# Patient Record
Sex: MALE | Race: WHITE | NOT HISPANIC OR LATINO | Employment: UNEMPLOYED
[De-identification: names, ages, dates, MRNs, and addresses within clinical notes are randomized per-mention and may not be internally consistent; named-entity substitution may affect disease eponyms.]

---

## 2018-04-13 ENCOUNTER — SURGERY (OUTPATIENT)
Age: 53
End: 2018-04-13

## 2018-04-13 ENCOUNTER — HOSPITAL ENCOUNTER (INPATIENT)
Facility: HOSPITAL | Age: 53
LOS: 1 days | Discharge: HOME | DRG: 247 | End: 2018-04-14
Attending: EMERGENCY MEDICINE | Admitting: INTERNAL MEDICINE
Payer: COMMERCIAL

## 2018-04-13 DIAGNOSIS — I21.9 MYOCARDIAL INFARCTION: ICD-10-CM

## 2018-04-13 DIAGNOSIS — I10 ESSENTIAL (PRIMARY) HYPERTENSION: ICD-10-CM

## 2018-04-13 DIAGNOSIS — I21.4 NSTEMI (NON-ST ELEVATED MYOCARDIAL INFARCTION): ICD-10-CM

## 2018-04-13 DIAGNOSIS — I51.7 CARDIOMEGALY: ICD-10-CM

## 2018-04-13 DIAGNOSIS — R07.9 CHEST PAIN: ICD-10-CM

## 2018-04-13 DIAGNOSIS — I21.4 NON-STEMI (NON-ST ELEVATED MYOCARDIAL INFARCTION): Primary | ICD-10-CM

## 2018-04-13 PROBLEM — D72.829 LEUKOCYTOSIS: Status: ACTIVE | Noted: 2018-04-13

## 2018-04-13 PROBLEM — Z72.0 TOBACCO ABUSE: Status: ACTIVE | Noted: 2018-04-13

## 2018-04-13 LAB
ALBUMIN SERPL BCP-MCNC: 4.4 G/DL
ALP SERPL-CCNC: 84 U/L
ALT SERPL W/O P-5'-P-CCNC: 32 U/L
ANION GAP SERPL CALC-SCNC: 11 MMOL/L
APTT BLDCRRT: 25.7 SEC
AST SERPL-CCNC: 79 U/L
BASOPHILS # BLD AUTO: 0.02 K/UL
BASOPHILS NFR BLD: 0.1 %
BILIRUB SERPL-MCNC: 0.5 MG/DL
BUN SERPL-MCNC: 15 MG/DL
CALCIUM SERPL-MCNC: 9.5 MG/DL
CHLORIDE SERPL-SCNC: 105 MMOL/L
CO2 SERPL-SCNC: 21 MMOL/L
CORONARY STENOSIS: ABNORMAL
CORONARY STENT: YES
CREAT SERPL-MCNC: 0.8 MG/DL
D DIMER PPP IA.FEU-MCNC: 0.27 MG/L FEU
DIASTOLIC DYSFUNCTION: NO
DIFFERENTIAL METHOD: ABNORMAL
EOSINOPHIL # BLD AUTO: 0.1 K/UL
EOSINOPHIL NFR BLD: 0.8 %
ERYTHROCYTE [DISTWIDTH] IN BLOOD BY AUTOMATED COUNT: 12.9 %
EST. GFR  (AFRICAN AMERICAN): >60 ML/MIN/1.73 M^2
EST. GFR  (NON AFRICAN AMERICAN): >60 ML/MIN/1.73 M^2
GLUCOSE SERPL-MCNC: 118 MG/DL
HCT VFR BLD AUTO: 42.4 %
HGB BLD-MCNC: 14.9 G/DL
INR PPP: 1
LYMPHOCYTES # BLD AUTO: 1.9 K/UL
LYMPHOCYTES NFR BLD: 12.2 %
MCH RBC QN AUTO: 32.1 PG
MCHC RBC AUTO-ENTMCNC: 35.1 G/DL
MCV RBC AUTO: 91 FL
MONOCYTES # BLD AUTO: 1.2 K/UL
MONOCYTES NFR BLD: 7.2 %
NEUTROPHILS # BLD AUTO: 12.6 K/UL
NEUTROPHILS NFR BLD: 79.7 %
PLATELET # BLD AUTO: 241 K/UL
PMV BLD AUTO: 10.4 FL
POC ACTIVATED CLOTTING TIME K: 125 SEC (ref 74–137)
POC ACTIVATED CLOTTING TIME K: 136 SEC (ref 74–137)
POC ACTIVATED CLOTTING TIME K: 142 SEC (ref 74–137)
POC ACTIVATED CLOTTING TIME K: 323 SEC (ref 74–137)
POC ACTIVATED CLOTTING TIME K: 362 SEC (ref 74–137)
POC ACTIVATED CLOTTING TIME K: 98 SEC (ref 74–137)
POTASSIUM SERPL-SCNC: 3.8 MMOL/L
PROT SERPL-MCNC: 7.1 G/DL
PROTHROMBIN TIME: 10.7 SEC
RBC # BLD AUTO: 4.64 M/UL
RETIRED EF AND QEF - SEE NOTES: 60 (ref 55–65)
SAMPLE: ABNORMAL
SAMPLE: NORMAL
SODIUM SERPL-SCNC: 137 MMOL/L
TROPONIN I SERPL DL<=0.01 NG/ML-MCNC: 4.41 NG/ML
WBC # BLD AUTO: 15.87 K/UL

## 2018-04-13 PROCEDURE — 63600175 PHARM REV CODE 636 W HCPCS

## 2018-04-13 PROCEDURE — 92941 PRQ TRLML REVSC TOT OCCL AMI: CPT | Mod: RC,,, | Performed by: INTERNAL MEDICINE

## 2018-04-13 PROCEDURE — 85379 FIBRIN DEGRADATION QUANT: CPT

## 2018-04-13 PROCEDURE — 85730 THROMBOPLASTIN TIME PARTIAL: CPT

## 2018-04-13 PROCEDURE — 96365 THER/PROPH/DIAG IV INF INIT: CPT

## 2018-04-13 PROCEDURE — 63600175 PHARM REV CODE 636 W HCPCS: Performed by: EMERGENCY MEDICINE

## 2018-04-13 PROCEDURE — 99285 EMERGENCY DEPT VISIT HI MDM: CPT | Mod: 25

## 2018-04-13 PROCEDURE — 93458 L HRT ARTERY/VENTRICLE ANGIO: CPT | Mod: 26,59,, | Performed by: INTERNAL MEDICINE

## 2018-04-13 PROCEDURE — 84484 ASSAY OF TROPONIN QUANT: CPT

## 2018-04-13 PROCEDURE — 93306 TTE W/DOPPLER COMPLETE: CPT

## 2018-04-13 PROCEDURE — 96374 THER/PROPH/DIAG INJ IV PUSH: CPT | Mod: 59

## 2018-04-13 PROCEDURE — 027034Z DILATION OF CORONARY ARTERY, ONE ARTERY WITH DRUG-ELUTING INTRALUMINAL DEVICE, PERCUTANEOUS APPROACH: ICD-10-PCS | Performed by: INTERNAL MEDICINE

## 2018-04-13 PROCEDURE — 25000003 PHARM REV CODE 250: Performed by: PHYSICIAN ASSISTANT

## 2018-04-13 PROCEDURE — 93306 TTE W/DOPPLER COMPLETE: CPT | Mod: 26,,, | Performed by: INTERNAL MEDICINE

## 2018-04-13 PROCEDURE — B2151ZZ FLUOROSCOPY OF LEFT HEART USING LOW OSMOLAR CONTRAST: ICD-10-PCS | Performed by: INTERNAL MEDICINE

## 2018-04-13 PROCEDURE — 25000003 PHARM REV CODE 250: Performed by: EMERGENCY MEDICINE

## 2018-04-13 PROCEDURE — 25000003 PHARM REV CODE 250: Performed by: INTERNAL MEDICINE

## 2018-04-13 PROCEDURE — 85347 COAGULATION TIME ACTIVATED: CPT

## 2018-04-13 PROCEDURE — 99255 IP/OBS CONSLTJ NEW/EST HI 80: CPT | Mod: ,,, | Performed by: INTERNAL MEDICINE

## 2018-04-13 PROCEDURE — 25000003 PHARM REV CODE 250

## 2018-04-13 PROCEDURE — 93458 L HRT ARTERY/VENTRICLE ANGIO: CPT | Mod: 59

## 2018-04-13 PROCEDURE — 85610 PROTHROMBIN TIME: CPT

## 2018-04-13 PROCEDURE — 4A023N7 MEASUREMENT OF CARDIAC SAMPLING AND PRESSURE, LEFT HEART, PERCUTANEOUS APPROACH: ICD-10-PCS | Performed by: INTERNAL MEDICINE

## 2018-04-13 PROCEDURE — 85025 COMPLETE CBC W/AUTO DIFF WBC: CPT

## 2018-04-13 PROCEDURE — 93005 ELECTROCARDIOGRAM TRACING: CPT

## 2018-04-13 PROCEDURE — 80053 COMPREHEN METABOLIC PANEL: CPT

## 2018-04-13 PROCEDURE — B2111ZZ FLUOROSCOPY OF MULTIPLE CORONARY ARTERIES USING LOW OSMOLAR CONTRAST: ICD-10-PCS | Performed by: INTERNAL MEDICINE

## 2018-04-13 PROCEDURE — 99152 MOD SED SAME PHYS/QHP 5/>YRS: CPT | Mod: ,,, | Performed by: INTERNAL MEDICINE

## 2018-04-13 PROCEDURE — 63600175 PHARM REV CODE 636 W HCPCS: Mod: JG | Performed by: INTERNAL MEDICINE

## 2018-04-13 PROCEDURE — 93010 ELECTROCARDIOGRAM REPORT: CPT | Mod: ,,, | Performed by: INTERNAL MEDICINE

## 2018-04-13 PROCEDURE — C1769 GUIDE WIRE: HCPCS

## 2018-04-13 PROCEDURE — 20000000 HC ICU ROOM

## 2018-04-13 RX ORDER — PRASUGREL 10 MG/1
10 TABLET, FILM COATED ORAL DAILY
Status: DISCONTINUED | OUTPATIENT
Start: 2018-04-13 | End: 2018-04-14 | Stop reason: HOSPADM

## 2018-04-13 RX ORDER — ATORVASTATIN CALCIUM 40 MG/1
80 TABLET, FILM COATED ORAL NIGHTLY
Status: DISCONTINUED | OUTPATIENT
Start: 2018-04-13 | End: 2018-04-14 | Stop reason: HOSPADM

## 2018-04-13 RX ORDER — CLOPIDOGREL 300 MG/1
600 TABLET, FILM COATED ORAL
Status: COMPLETED | OUTPATIENT
Start: 2018-04-13 | End: 2018-04-13

## 2018-04-13 RX ORDER — PRASUGREL 10 MG/1
10 TABLET, FILM COATED ORAL DAILY
Status: DISCONTINUED | OUTPATIENT
Start: 2018-04-14 | End: 2018-04-13

## 2018-04-13 RX ORDER — MORPHINE SULFATE 4 MG/ML
2 INJECTION, SOLUTION INTRAMUSCULAR; INTRAVENOUS EVERY 4 HOURS PRN
Status: DISCONTINUED | OUTPATIENT
Start: 2018-04-13 | End: 2018-04-14

## 2018-04-13 RX ORDER — HYDRALAZINE HYDROCHLORIDE 20 MG/ML
10 INJECTION INTRAMUSCULAR; INTRAVENOUS EVERY 4 HOURS PRN
Status: DISCONTINUED | OUTPATIENT
Start: 2018-04-13 | End: 2018-04-14 | Stop reason: HOSPADM

## 2018-04-13 RX ORDER — SODIUM CHLORIDE 9 MG/ML
INJECTION, SOLUTION INTRAVENOUS CONTINUOUS
Status: DISCONTINUED | OUTPATIENT
Start: 2018-04-13 | End: 2018-04-14 | Stop reason: HOSPADM

## 2018-04-13 RX ORDER — LISINOPRIL 20 MG/1
20 TABLET ORAL DAILY
Status: ON HOLD | COMMUNITY
End: 2018-04-14 | Stop reason: HOSPADM

## 2018-04-13 RX ORDER — FAMOTIDINE 20 MG/1
20 TABLET, FILM COATED ORAL 2 TIMES DAILY
Status: DISCONTINUED | OUTPATIENT
Start: 2018-04-13 | End: 2018-04-14 | Stop reason: HOSPADM

## 2018-04-13 RX ORDER — HEPARIN SODIUM,PORCINE/D5W 25000/250
12 INTRAVENOUS SOLUTION INTRAVENOUS CONTINUOUS
Status: DISCONTINUED | OUTPATIENT
Start: 2018-04-13 | End: 2018-04-13

## 2018-04-13 RX ORDER — MELOXICAM 15 MG/1
15 TABLET ORAL DAILY
Status: ON HOLD | COMMUNITY
End: 2018-04-14 | Stop reason: HOSPADM

## 2018-04-13 RX ORDER — ASPIRIN 325 MG
325 TABLET, DELAYED RELEASE (ENTERIC COATED) ORAL
Status: COMPLETED | OUTPATIENT
Start: 2018-04-13 | End: 2018-04-13

## 2018-04-13 RX ORDER — SODIUM CHLORIDE 9 MG/ML
INJECTION, SOLUTION INTRAVENOUS CONTINUOUS
Status: DISCONTINUED | OUTPATIENT
Start: 2018-04-13 | End: 2018-04-13 | Stop reason: SDUPTHER

## 2018-04-13 RX ORDER — ASPIRIN 325 MG
325 TABLET, DELAYED RELEASE (ENTERIC COATED) ORAL DAILY
Status: DISCONTINUED | OUTPATIENT
Start: 2018-04-14 | End: 2018-04-13 | Stop reason: DRUGHIGH

## 2018-04-13 RX ORDER — NITROGLYCERIN 20 MG/100ML
5 INJECTION INTRAVENOUS CONTINUOUS
Status: DISCONTINUED | OUTPATIENT
Start: 2018-04-13 | End: 2018-04-14

## 2018-04-13 RX ORDER — HEPARIN SODIUM 5000 [USP'U]/ML
4000 INJECTION, SOLUTION INTRAVENOUS; SUBCUTANEOUS
Status: COMPLETED | OUTPATIENT
Start: 2018-04-13 | End: 2018-04-13

## 2018-04-13 RX ORDER — ASPIRIN 81 MG/1
81 TABLET ORAL DAILY
Status: DISCONTINUED | OUTPATIENT
Start: 2018-04-14 | End: 2018-04-14 | Stop reason: HOSPADM

## 2018-04-13 RX ORDER — ATORVASTATIN CALCIUM 40 MG/1
40 TABLET, FILM COATED ORAL DAILY
Status: ON HOLD | COMMUNITY
End: 2018-04-14 | Stop reason: HOSPADM

## 2018-04-13 RX ORDER — TIROFIBAN HYDROCHLORIDE 50 UG/ML
0.15 INJECTION INTRAVENOUS CONTINUOUS
Status: DISCONTINUED | OUTPATIENT
Start: 2018-04-13 | End: 2018-04-14

## 2018-04-13 RX ORDER — METOPROLOL TARTRATE 25 MG/1
25 TABLET, FILM COATED ORAL 2 TIMES DAILY
Status: DISCONTINUED | OUTPATIENT
Start: 2018-04-13 | End: 2018-04-14 | Stop reason: HOSPADM

## 2018-04-13 RX ORDER — HYDROCODONE BITARTRATE AND ACETAMINOPHEN 5; 325 MG/1; MG/1
1 TABLET ORAL EVERY 4 HOURS PRN
Status: DISCONTINUED | OUTPATIENT
Start: 2018-04-13 | End: 2018-04-14 | Stop reason: HOSPADM

## 2018-04-13 RX ORDER — MORPHINE SULFATE 4 MG/ML
4 INJECTION, SOLUTION INTRAMUSCULAR; INTRAVENOUS EVERY 4 HOURS PRN
Status: DISCONTINUED | OUTPATIENT
Start: 2018-04-13 | End: 2018-04-14

## 2018-04-13 RX ORDER — ALPRAZOLAM 0.5 MG/1
0.5 TABLET ORAL 3 TIMES DAILY PRN
Status: DISCONTINUED | OUTPATIENT
Start: 2018-04-13 | End: 2018-04-14 | Stop reason: HOSPADM

## 2018-04-13 RX ORDER — ONDANSETRON 2 MG/ML
4 INJECTION INTRAMUSCULAR; INTRAVENOUS EVERY 8 HOURS PRN
Status: DISCONTINUED | OUTPATIENT
Start: 2018-04-13 | End: 2018-04-14 | Stop reason: HOSPADM

## 2018-04-13 RX ORDER — NITROGLYCERIN 20 MG/100ML
5 INJECTION INTRAVENOUS CONTINUOUS
Status: DISCONTINUED | OUTPATIENT
Start: 2018-04-13 | End: 2018-04-13

## 2018-04-13 RX ORDER — ONDANSETRON 8 MG/1
8 TABLET, ORALLY DISINTEGRATING ORAL EVERY 8 HOURS PRN
Status: DISCONTINUED | OUTPATIENT
Start: 2018-04-13 | End: 2018-04-14 | Stop reason: HOSPADM

## 2018-04-13 RX ADMIN — NITROGLYCERIN 5 MCG/MIN: 20 INJECTION INTRAVENOUS at 03:04

## 2018-04-13 RX ADMIN — HEPARIN SODIUM 12 UNITS/KG/HR: 10000 INJECTION, SOLUTION INTRAVENOUS at 02:04

## 2018-04-13 RX ADMIN — ATORVASTATIN CALCIUM 80 MG: 40 TABLET, FILM COATED ORAL at 09:04

## 2018-04-13 RX ADMIN — PRASUGREL 10 MG: 10 TABLET, FILM COATED ORAL at 07:04

## 2018-04-13 RX ADMIN — ASPIRIN 325 MG: 325 TABLET, DELAYED RELEASE ORAL at 12:04

## 2018-04-13 RX ADMIN — TIROFIBAN 0.15 MCG/KG/MIN: 5 INJECTION, SOLUTION INTRAVENOUS at 07:04

## 2018-04-13 RX ADMIN — CLOPIDOGREL BISULFATE 600 MG: 300 TABLET, FILM COATED ORAL at 01:04

## 2018-04-13 RX ADMIN — MORPHINE SULFATE 2 MG: 4 INJECTION INTRAVENOUS at 09:04

## 2018-04-13 RX ADMIN — SODIUM CHLORIDE: 0.9 INJECTION, SOLUTION INTRAVENOUS at 10:04

## 2018-04-13 RX ADMIN — HEPARIN SODIUM 4000 UNITS: 5000 INJECTION, SOLUTION INTRAVENOUS; SUBCUTANEOUS at 02:04

## 2018-04-13 RX ADMIN — SODIUM CHLORIDE: 0.9 INJECTION, SOLUTION INTRAVENOUS at 07:04

## 2018-04-13 RX ADMIN — FAMOTIDINE 20 MG: 20 TABLET ORAL at 09:04

## 2018-04-13 RX ADMIN — SODIUM CHLORIDE: 0.9 INJECTION, SOLUTION INTRAVENOUS at 03:04

## 2018-04-13 NOTE — H&P (VIEW-ONLY)
[facility]  Cardiology  Consult Note    Patient Name: Anatoly Polanco  MRN: [id number]  Admission Date: 4/13/2018  Hospital Length of Stay: 0 days  Code Status: No Order   Attending Provider: Edgar Ledbetter Jr., MD   Consulting Provider: INDERJIT Auguste  Primary Care Physician: Pool Martinez MD  Principal Problem:<principal problem not specified>    Patient information was obtained from patient and ER records.     Inpatient consult to Cardiology  Consult performed by: JAD POWER  Consult ordered by: EDGAR LEDBETTER JR  Reason for consult: NSTEMI        Subjective:     Chief Complaint:  Chest pain      HPI:   Mr. Polanco presents to the ED with complaints of chest pain. Patient states that he was at work on yesterday and became very diaphoretic. Went inside to cool off, but still was very diaphoretic. Denies any associated SOB, but was nauseous.  States that he went home and rested. Chest pain continued this morning. 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 Presented to the ED when symptoms didn't improve. EKG upon arrival showed no evidence of STEMI. Did show LVH. Initial troponin 4.411. Patient states that he is still having chest pain at time of consult, 7/10. Has been started on Heparin gtt and Tridil. BP stable. Denies any family history of CAD. Doesn't smoke or drink ETOH       Past Medical History:   Diagnosis Date    Hypertension        Past Surgical History:   Procedure Laterality Date    JOINT REPLACEMENT         Review of patient's allergies indicates:  No Known Allergies    No current facility-administered medications on file prior to encounter.      No current outpatient prescriptions on file prior to encounter.     Family History     None        Social History Main Topics    Smoking status: Current Every Day Smoker    Smokeless tobacco: Not on file    Alcohol use Not on file    Drug use: Unknown     Sexual activity: Not on file     Review of Systems   Constitution: Negative for diaphoresis, weakness, malaise/fatigue, weight gain and weight loss.   HENT: Negative for congestion and nosebleeds.    Cardiovascular: Positive for chest pain. Negative for claudication, cyanosis, dyspnea on exertion, irregular heartbeat, leg swelling, near-syncope, orthopnea, palpitations, paroxysmal nocturnal dyspnea and syncope.   Respiratory: Negative for cough, hemoptysis, shortness of breath, sleep disturbances due to breathing, snoring, sputum production and wheezing.    Hematologic/Lymphatic: Negative for bleeding problem. Does not bruise/bleed easily.   Skin: Negative for rash.   Musculoskeletal: Negative for arthritis, back pain, falls, joint pain, muscle cramps and muscle weakness.   Gastrointestinal: Positive for nausea. Negative for abdominal pain, constipation, diarrhea, heartburn, hematemesis, hematochezia and melena.   Genitourinary: Negative for dysuria, hematuria and nocturia.   Neurological: Negative for excessive daytime sleepiness, dizziness, headaches, light-headedness, loss of balance, numbness and vertigo.     Objective:     Vital Signs (Most Recent):  Temp: 98.3 °F (36.8 °C) (04/13/18 1206)  Pulse: 64 (04/13/18 1502)  Resp: 15 (04/13/18 1502)  BP: (!) 144/85 (04/13/18 1502)  SpO2: 96 % (04/13/18 1502) Vital Signs (24h Range):  Temp:  [98.3 °F (36.8 °C)] 98.3 °F (36.8 °C)  Pulse:  [46-69] 64  Resp:  [15-22] 15  SpO2:  [95 %-98 %] 96 %  BP: (126-152)/(64-85) 144/85     Weight: 99.4 kg (219 lb 2.2 oz)  Body mass index is 29.72 kg/m².    SpO2: 96 %  O2 Device (Oxygen Therapy): nasal cannula w/ humidification    No intake or output data in the 24 hours ending 04/13/18 1529    Lines/Drains/Airways     Peripheral Intravenous Line                 Peripheral IV - Single Lumen 04/13/18 1234 Right Antecubital less than 1 day         Peripheral IV - Single Lumen 04/13/18 1510 Right Wrist less than 1 day                 Physical Exam   Constitutional: He is oriented to person, place, and time. He appears well-developed and well-nourished.   Neck: Neck supple. No JVD present.   Cardiovascular: Normal rate, regular rhythm and normal pulses.  Exam reveals no friction rub.    Murmur heard.  Pulmonary/Chest: Effort normal and breath sounds normal. No respiratory distress. He has no wheezes. He has no rales.   Abdominal: Soft. Bowel sounds are normal. He exhibits no distension.   Musculoskeletal: He exhibits no edema or tenderness.   Neurological: He is alert and oriented to person, place, and time.   Skin: Skin is warm and dry. No rash noted.   Psychiatric: He has a normal mood and affect. His behavior is normal.   Nursing note and vitals reviewed.      Significant Labs:   All pertinent lab results from the last 24 hours have been reviewed. and   Recent Lab Results       04/13/18  1234      Albumin 4.4     Alkaline Phosphatase 84     ALT 32     Anion Gap 11     aPTT 25.7  Comment:  aPTT therapeutic range = 39-69 seconds     AST 79(H)     Baso # 0.02     Basophil% 0.1     Total Bilirubin 0.5  Comment:  For infants and newborns, interpretation of results should be based  on gestational age, weight and in agreement with clinical  observations.  Premature Infant recommended reference ranges:  Up to 24 hours.............<8.0 mg/dL  Up to 48 hours............<12.0 mg/dL  3-5 days..................<15.0 mg/dL  6-29 days.................<15.0 mg/dL       BUN, Bld 15     Calcium 9.5     Chloride 105     CO2 21(L)     Creatinine 0.8     D-Dimer 0.27  Comment:  The quantitative D-dimer assay should be used as an aid in   the diagnosis of deep vein thrombosis and pulmonary embolism  in patients with the appropriate presentation and clinical  history. The upper limit of the reference interval and the clinical   cut off   point are identical. Causes of a positive (>0.50 mg/L FEU) D-Dimer   test  include, but are not limited to: DVT, PE, DIC,  thrombolytic   therapy, anticoagulant therapy, recent surgery, trauma, or   pregnancy, disseminated malignancy, aortic aneurysm, cirrhosis,  and severe infection. False negative results may occur in   patients with distal DVT.       Differential Method Automated     eGFR if  >60     eGFR if non  >60  Comment:  Calculation used to obtain the estimated glomerular filtration  rate (eGFR) is the CKD-EPI equation.        Eos # 0.1     Eosinophil% 0.8     Glucose 118(H)     Gran # (ANC) 12.6(H)     Gran% 79.7(H)     Hematocrit 42.4     Hemoglobin 14.9     Coumadin Monitoring INR 1.0  Comment:  Coumadin Therapy:  2.0 - 3.0 for INR for all indicators except mechanical heart valves  and antiphospholipid syndromes which should use 2.5 - 3.5.       Lymph # 1.9     Lymph% 12.2(L)     MCH 32.1(H)     MCHC 35.1     MCV 91     Mono # 1.2(H)     Mono% 7.2     MPV 10.4     Platelets 241     Potassium 3.8     Total Protein 7.1     Protime 10.7     RBC 4.64     RDW 12.9     Sodium 137     Troponin I 4.411  Comment:  The reference interval for Troponin I represents the 99th percentile   cutoff   for our facility and is consistent with 3rd generation assay   performance.  (H)     WBC 15.87(H)           Significant Imaging: Echocardiogram: 2D echo with color flow doppler Pending and X-Ray: CXR: X-Ray Chest 1 View   Narrative     Clinical Data:Chest pain    Comparison:  none    Findings:  Single view of the chest.      Cardiac silhouette is normal.  The lungs demonstrate no evidence of active disease.  No evidence of pleural effusion or pneumothorax.  Bones appear intact.   Impression        No acute process seen.      Electronically signed by: LILLIAN PRUITT MD  Date: 04/13/18  Time: 12:46          Assessment and Plan:     Non-STEMI (non-ST elevated myocardial infarction)    -Patient presents with NSTEMI.  -Had chest pain that started on yesterday.   -Trop of 4.411.   -LVH on ECG with no acute  changes  -Recommend starting Heparin gtt  -Plavix load 600mg daily   -Continue Tridil gtt for now  -ASA, Statin   -No BB due to bradycardia   -Check 2D echo  -Recommend [facility] today for NSTEMI and ongoing chest pain. Dr. Choi explained the risks, benefits and alternatives of the procedure in detail. The patient voices understanding and all questions have been answered. Patient agrees to proceed as planned.  -NS at 75/hr             VTE Risk Mitigation         Ordered     heparin 25,000 units in dextrose 5% 250 mL (100 units/mL) infusion; MALE  Continuous     Route:  Intravenous        04/13/18 1333     heparin 25,000 units in dextrose 5% 250 mL (100 units/mL) bolus from bag; PRN BOLUS  As needed (PRN)     Route:  Intravenous        04/13/18 1333     heparin 25,000 units in dextrose 5% 250 mL (100 units/mL) bolus from bag; PRN BOLUS  As needed (PRN)     Route:  Intravenous        04/13/18 1333        Chart reviewed. Patient examined by Dr. Choi and agrees with plan that has been outlined.     Thank you for your consult. I will follow-up with patient. Please contact us if you have any additional questions.    INDERJIT Auguste  Cardiology   [facility]

## 2018-04-13 NOTE — ED PROVIDER NOTES
"SCRIBE #1 NOTE: I, London Palmer, am scribing for, and in the presence of, Edgar Ledbetter Jr., MD. I have scribed the entire note.      History      Chief Complaint   Patient presents with    Chest Pain     with L shoulder/back pain with diaphoresis/nausea since yesterday.        Review of patient's allergies indicates:  No Known Allergies     HPI   HPI    4/13/2018, 12:23 PM   History obtained from the patient and wife      History of Present Illness: Anatoly Polanco is a 52 y.o. male patient who presents to the Emergency Department for left chest pain which onset gradually yesterday. Sxs are constant and moderate in severity. Pt reports lifting heavy "parts" 2 days ago. There are no mitigating or exacerbating factors noted. Associated sxs include left shoulder pain.  Pt denies any fever, emesis, diarrhea, numbness, leg swelling, calf pain, SOB, HA, LOC, and all other sxs at this time. Pt is a daily smoker. No further complaints or concerns at this time.       Arrival mode: Personal vehicle      PCP: Pool Martinez MD       Past Medical History:  Past Medical History:   Diagnosis Date    Hypertension        Past Surgical History:  Past Surgical History:   Procedure Laterality Date    JOINT REPLACEMENT           Family History:  unknown    Social History:  Social History     Social History Main Topics    Smoking status: Current Every Day Smoker    Smokeless tobacco: unknown    Alcohol use unknown    Drug use: Unknown    Sexual activity: unknown       ROS   Review of Systems   Constitutional: Negative for fever.   HENT: Negative for sore throat.    Respiratory: Negative for shortness of breath.    Cardiovascular: Positive for chest pain.   Gastrointestinal: Positive for nausea. Negative for abdominal pain, constipation, diarrhea and vomiting.   Genitourinary: Negative for dysuria.   Musculoskeletal: Positive for arthralgias (L shoulder). Negative for back pain.   Skin: Negative for rash. "   Neurological: Negative for weakness.   Hematological: Does not bruise/bleed easily.     Physical Exam      Initial Vitals [04/13/18 1206]   BP Pulse Resp Temp SpO2   (!) 142/68 (!) 46 18 98.3 °F (36.8 °C) 98 %      MAP       92.67          Physical Exam  Nursing Notes and Vital Signs Reviewed.  Constitutional: Patient is in no acute distress. Well-developed and well-nourished.  Head: Atraumatic. Normocephalic.  Eyes: PERRL. EOM intact. Conjunctivae are not pale. No scleral icterus.  ENT: Mucous membranes are moist. Oropharynx is clear and symmetric.    Neck: Supple. Full ROM. No lymphadenopathy.  Cardiovascular: Bradycardia. Regular rhythm. No murmurs, rubs, or gallops. Distal pulses are 2+ and symmetric.  Pulmonary/Chest: No respiratory distress. Clear to auscultation bilaterally. No wheezing or rales.  Abdominal: Soft and non-distended.  There is no tenderness.  No rebound, guarding, or rigidity. Good bowel sounds.  Genitourinary: No CVA tenderness  Musculoskeletal: Moves all extremities. No obvious deformities. No edema. No calf tenderness.  Skin: Warm and dry.  Neurological:  Alert, awake, and appropriate.  Normal speech.  No acute focal neurological deficits are appreciated.  Psychiatric: Normal affect. Good eye contact. Appropriate in content.    ED Course    Procedures  ED Vital Signs:  Vitals:    04/13/18 1206 04/13/18 1207 04/13/18 1302 04/13/18 1332   BP: (!) 142/68  126/70 131/70   Pulse: (!) 46  (!) 47 (!) 49   Resp: 18  (!) 22 19   Temp: 98.3 °F (36.8 °C)      TempSrc: Oral      SpO2: 98%  95% 95%   Weight:  99.4 kg (219 lb 2.2 oz)     Height: 6' (1.829 m)       04/13/18 1402   BP: (!) 152/78   Pulse: 69   Resp: 19   Temp:    TempSrc:    SpO2: 96%   Weight:    Height:        Abnormal Lab Results:  Labs Reviewed   CBC W/ AUTO DIFFERENTIAL - Abnormal; Notable for the following:        Result Value    WBC 15.87 (*)     MCH 32.1 (*)     Gran # (ANC) 12.6 (*)     Mono # 1.2 (*)     Gran% 79.7 (*)      Lymph% 12.2 (*)     All other components within normal limits   COMPREHENSIVE METABOLIC PANEL - Abnormal; Notable for the following:     CO2 21 (*)     Glucose 118 (*)     AST 79 (*)     All other components within normal limits   TROPONIN I - Abnormal; Notable for the following:     Troponin I 4.411 (*)     All other components within normal limits   PROTIME-INR   APTT   D DIMER, QUANTITATIVE   APTT   PROTIME-INR   PLATELET COUNT   CBC W/ AUTO DIFFERENTIAL        All Lab Results:  Results for orders placed or performed during the hospital encounter of 04/13/18   CBC auto differential   Result Value Ref Range    WBC 15.87 (H) 3.90 - 12.70 K/uL    RBC 4.64 4.60 - 6.20 M/uL    Hemoglobin 14.9 14.0 - 18.0 g/dL    Hematocrit 42.4 40.0 - 54.0 %    MCV 91 82 - 98 fL    MCH 32.1 (H) 27.0 - 31.0 pg    MCHC 35.1 32.0 - 36.0 g/dL    RDW 12.9 11.5 - 14.5 %    Platelets 241 150 - 350 K/uL    MPV 10.4 9.2 - 12.9 fL    Gran # (ANC) 12.6 (H) 1.8 - 7.7 K/uL    Lymph # 1.9 1.0 - 4.8 K/uL    Mono # 1.2 (H) 0.3 - 1.0 K/uL    Eos # 0.1 0.0 - 0.5 K/uL    Baso # 0.02 0.00 - 0.20 K/uL    Gran% 79.7 (H) 38.0 - 73.0 %    Lymph% 12.2 (L) 18.0 - 48.0 %    Mono% 7.2 4.0 - 15.0 %    Eosinophil% 0.8 0.0 - 8.0 %    Basophil% 0.1 0.0 - 1.9 %    Differential Method Automated    Comprehensive metabolic panel   Result Value Ref Range    Sodium 137 136 - 145 mmol/L    Potassium 3.8 3.5 - 5.1 mmol/L    Chloride 105 95 - 110 mmol/L    CO2 21 (L) 23 - 29 mmol/L    Glucose 118 (H) 70 - 110 mg/dL    BUN, Bld 15 6 - 20 mg/dL    Creatinine 0.8 0.5 - 1.4 mg/dL    Calcium 9.5 8.7 - 10.5 mg/dL    Total Protein 7.1 6.0 - 8.4 g/dL    Albumin 4.4 3.5 - 5.2 g/dL    Total Bilirubin 0.5 0.1 - 1.0 mg/dL    Alkaline Phosphatase 84 55 - 135 U/L    AST 79 (H) 10 - 40 U/L    ALT 32 10 - 44 U/L    Anion Gap 11 8 - 16 mmol/L    eGFR if African American >60 >60 mL/min/1.73 m^2    eGFR if non African American >60 >60 mL/min/1.73 m^2   Protime-INR   Result Value Ref Range     Prothrombin Time 10.7 9.0 - 12.5 sec    INR 1.0 0.8 - 1.2   APTT   Result Value Ref Range    aPTT 25.7 21.0 - 32.0 sec   Troponin I   Result Value Ref Range    Troponin I 4.411 (H) 0.000 - 0.026 ng/mL   D dimer, quantitative   Result Value Ref Range    D-Dimer 0.27 <0.50 mg/L FEU         Imaging Results:  Imaging Results          X-Ray Chest AP Portable (Final result)  Result time 04/13/18 12:47:00    Final result by Alexander Mariano MD (04/13/18 12:47:00)                 Impression:       No acute process seen.      Electronically signed by: ALEXANDER MARIANO MD  Date:     04/13/18  Time:    12:46              Narrative:    Clinical Data:Chest pain    Comparison:  none    Findings:  Single view of the chest.      Cardiac silhouette is normal.  The lungs demonstrate no evidence of active disease.  No evidence of pleural effusion or pneumothorax.  Bones appear intact.                                      The EKG was ordered, reviewed, and independently interpreted by the ED provider.  Interpretation time: 12:13  Rate: 47 BPM  Rhythm: sinus bradycardia  Interpretation: LVH with repolarization abnormality. No STEMI.      The Emergency Provider reviewed the vital signs and test results, which are outlined above.    ED Discussion     1:35 PM: Dr. Ledbetter discussed the pt's case with Dr. Choi (Cardiology) who recommends starting pt on PLAVIX and admit to hospital.    1:36 PM: Re-evaluated pt. I have discussed test results, shared treatment plan, and the need for admission with patient and family at bedside. Pt and family express understanding at this time and agree with all information. All questions answered. Pt and family have no further questions or concerns at this time. Pt is ready for admit.    2:12 PM: Discussed case with DONAVAN Jeffrey (Hospital Medicine). Tammy agrees with current care and management of pt and accepts admission.   Admitting Service: Hospital medicine   Admitting Physician: Dr. Bartholomew  Admit to:  telemetry      ED Medication(s):  Medications   heparin 25,000 units in dextrose 5% 250 mL (100 units/mL) bolus from bag; PRN BOLUS (not administered)   heparin 25,000 units in dextrose 5% 250 mL (100 units/mL) bolus from bag; PRN BOLUS (not administered)   heparin 25,000 units in dextrose 5% 250 mL (100 units/mL) infusion; MALE (12 Units/kg/hr × 99.4 kg Intravenous New Bag 4/13/18 1405)   aspirin EC tablet 325 mg (325 mg Oral Given 4/13/18 1245)   heparin (porcine) injection 4,000 Units (4,000 Units Intravenous Given 4/13/18 1400)   clopidogrel tablet 600 mg (600 mg Oral Given 4/13/18 1359)       New Prescriptions    No medications on file             Medical Decision Making    Medical Decision Making:   Clinical Tests:   Lab Tests: Reviewed and Ordered  Radiological Study: Reviewed and Ordered  Medical Tests: Reviewed and Ordered           Scribe Attestation:   Scribe #1: I performed the above scribed service and the documentation accurately describes the services I performed. I attest to the accuracy of the note.    Attending:   Physician Attestation Statement for Scribe #1: I, Edgar Ledbetter Jr., MD, personally performed the services described in this documentation, as scribed by London Palmer, in my presence, and it is both accurate and complete.          Clinical Impression       ICD-10-CM ICD-9-CM   1. Non-STEMI (non-ST elevated myocardial infarction) I21.4 410.70   2. Chest pain R07.9 786.50       Disposition:   Disposition: Admitted  Condition: Fair         Edgar Ledbetter Jr., MD  04/13/18 6589

## 2018-04-13 NOTE — ASSESSMENT & PLAN NOTE
-ASA daily.   -Heparin and Tridil drips.   -Cardiology consult.   -Trend troponins.   -Continue lisinopril and statin.   -Hold beta-blockers due to asymptomatic bradycardia.    -Patient evaluated for Cardiac rehab and is not a candidate at this time. He will be reevaluated and referred when appropriate.

## 2018-04-13 NOTE — ED NOTES
Patient placed on continuous cardiac monitor, automatic blood pressure cuff and continuous pulse oximeter.[email or web address] [email or web address]

## 2018-04-13 NOTE — INTERVAL H&P NOTE
The patient has been examined and the H&P has been reviewed:    I concur with the findings and no changes have occurred since H&P was written.    Anesthesia/Surgery risks, benefits and alternative options discussed and understood by patient/family.          Active Hospital Problems    Diagnosis  POA    *Non-STEMI (non-ST elevated myocardial infarction) [I21.4]  Yes    Hypertension [I10]  Yes    Tobacco abuse [Z72.0]  Yes      Resolved Hospital Problems    Diagnosis Date Resolved POA   No resolved problems to display.

## 2018-04-13 NOTE — CONSULTS
[facility]  Cardiology  Consult Note    Patient Name: Anatoly Polanco  MRN: [id number]  Admission Date: 4/13/2018  Hospital Length of Stay: 0 days  Code Status: No Order   Attending Provider: Edgar Ledbetter Jr., MD   Consulting Provider: INDERJIT Auguste  Primary Care Physician: Pool Martinez MD  Principal Problem:<principal problem not specified>    Patient information was obtained from patient and ER records.     Inpatient consult to Cardiology  Consult performed by: JAD POWER  Consult ordered by: EDGAR LEDBETTER JR  Reason for consult: NSTEMI        Subjective:     Chief Complaint:  Chest pain      HPI:   Mr. Polanco presents to the ED with complaints of chest pain. Patient states that he was at work on yesterday and became very diaphoretic. Went inside to cool off, but still was very diaphoretic. Denies any associated SOB, but was nauseous.  States that he went home and rested. Chest pain continued this morning. 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 Presented to the ED when symptoms didn't improve. EKG upon arrival showed no evidence of STEMI. Did show LVH. Initial troponin 4.411. Patient states that he is still having chest pain at time of consult, 7/10. Has been started on Heparin gtt and Tridil. BP stable. Denies any family history of CAD. Doesn't smoke or drink ETOH       Past Medical History:   Diagnosis Date    Hypertension        Past Surgical History:   Procedure Laterality Date    JOINT REPLACEMENT         Review of patient's allergies indicates:  No Known Allergies    No current facility-administered medications on file prior to encounter.      No current outpatient prescriptions on file prior to encounter.     Family History     None        Social History Main Topics    Smoking status: Current Every Day Smoker    Smokeless tobacco: Not on file    Alcohol use Not on file    Drug use: Unknown     Sexual activity: Not on file     Review of Systems   Constitution: Negative for diaphoresis, weakness, malaise/fatigue, weight gain and weight loss.   HENT: Negative for congestion and nosebleeds.    Cardiovascular: Positive for chest pain. Negative for claudication, cyanosis, dyspnea on exertion, irregular heartbeat, leg swelling, near-syncope, orthopnea, palpitations, paroxysmal nocturnal dyspnea and syncope.   Respiratory: Negative for cough, hemoptysis, shortness of breath, sleep disturbances due to breathing, snoring, sputum production and wheezing.    Hematologic/Lymphatic: Negative for bleeding problem. Does not bruise/bleed easily.   Skin: Negative for rash.   Musculoskeletal: Negative for arthritis, back pain, falls, joint pain, muscle cramps and muscle weakness.   Gastrointestinal: Positive for nausea. Negative for abdominal pain, constipation, diarrhea, heartburn, hematemesis, hematochezia and melena.   Genitourinary: Negative for dysuria, hematuria and nocturia.   Neurological: Negative for excessive daytime sleepiness, dizziness, headaches, light-headedness, loss of balance, numbness and vertigo.     Objective:     Vital Signs (Most Recent):  Temp: 98.3 °F (36.8 °C) (04/13/18 1206)  Pulse: 64 (04/13/18 1502)  Resp: 15 (04/13/18 1502)  BP: (!) 144/85 (04/13/18 1502)  SpO2: 96 % (04/13/18 1502) Vital Signs (24h Range):  Temp:  [98.3 °F (36.8 °C)] 98.3 °F (36.8 °C)  Pulse:  [46-69] 64  Resp:  [15-22] 15  SpO2:  [95 %-98 %] 96 %  BP: (126-152)/(64-85) 144/85     Weight: 99.4 kg (219 lb 2.2 oz)  Body mass index is 29.72 kg/m².    SpO2: 96 %  O2 Device (Oxygen Therapy): nasal cannula w/ humidification    No intake or output data in the 24 hours ending 04/13/18 1529    Lines/Drains/Airways     Peripheral Intravenous Line                 Peripheral IV - Single Lumen 04/13/18 1234 Right Antecubital less than 1 day         Peripheral IV - Single Lumen 04/13/18 1510 Right Wrist less than 1 day                 Physical Exam   Constitutional: He is oriented to person, place, and time. He appears well-developed and well-nourished.   Neck: Neck supple. No JVD present.   Cardiovascular: Normal rate, regular rhythm and normal pulses.  Exam reveals no friction rub.    Murmur heard.  Pulmonary/Chest: Effort normal and breath sounds normal. No respiratory distress. He has no wheezes. He has no rales.   Abdominal: Soft. Bowel sounds are normal. He exhibits no distension.   Musculoskeletal: He exhibits no edema or tenderness.   Neurological: He is alert and oriented to person, place, and time.   Skin: Skin is warm and dry. No rash noted.   Psychiatric: He has a normal mood and affect. His behavior is normal.   Nursing note and vitals reviewed.      Significant Labs:   All pertinent lab results from the last 24 hours have been reviewed. and   Recent Lab Results       04/13/18  1234      Albumin 4.4     Alkaline Phosphatase 84     ALT 32     Anion Gap 11     aPTT 25.7  Comment:  aPTT therapeutic range = 39-69 seconds     AST 79(H)     Baso # 0.02     Basophil% 0.1     Total Bilirubin 0.5  Comment:  For infants and newborns, interpretation of results should be based  on gestational age, weight and in agreement with clinical  observations.  Premature Infant recommended reference ranges:  Up to 24 hours.............<8.0 mg/dL  Up to 48 hours............<12.0 mg/dL  3-5 days..................<15.0 mg/dL  6-29 days.................<15.0 mg/dL       BUN, Bld 15     Calcium 9.5     Chloride 105     CO2 21(L)     Creatinine 0.8     D-Dimer 0.27  Comment:  The quantitative D-dimer assay should be used as an aid in   the diagnosis of deep vein thrombosis and pulmonary embolism  in patients with the appropriate presentation and clinical  history. The upper limit of the reference interval and the clinical   cut off   point are identical. Causes of a positive (>0.50 mg/L FEU) D-Dimer   test  include, but are not limited to: DVT, PE, DIC,  thrombolytic   therapy, anticoagulant therapy, recent surgery, trauma, or   pregnancy, disseminated malignancy, aortic aneurysm, cirrhosis,  and severe infection. False negative results may occur in   patients with distal DVT.       Differential Method Automated     eGFR if  >60     eGFR if non  >60  Comment:  Calculation used to obtain the estimated glomerular filtration  rate (eGFR) is the CKD-EPI equation.        Eos # 0.1     Eosinophil% 0.8     Glucose 118(H)     Gran # (ANC) 12.6(H)     Gran% 79.7(H)     Hematocrit 42.4     Hemoglobin 14.9     Coumadin Monitoring INR 1.0  Comment:  Coumadin Therapy:  2.0 - 3.0 for INR for all indicators except mechanical heart valves  and antiphospholipid syndromes which should use 2.5 - 3.5.       Lymph # 1.9     Lymph% 12.2(L)     MCH 32.1(H)     MCHC 35.1     MCV 91     Mono # 1.2(H)     Mono% 7.2     MPV 10.4     Platelets 241     Potassium 3.8     Total Protein 7.1     Protime 10.7     RBC 4.64     RDW 12.9     Sodium 137     Troponin I 4.411  Comment:  The reference interval for Troponin I represents the 99th percentile   cutoff   for our facility and is consistent with 3rd generation assay   performance.  (H)     WBC 15.87(H)           Significant Imaging: Echocardiogram: 2D echo with color flow doppler Pending and X-Ray: CXR: X-Ray Chest 1 View   Narrative     Clinical Data:Chest pain    Comparison:  none    Findings:  Single view of the chest.      Cardiac silhouette is normal.  The lungs demonstrate no evidence of active disease.  No evidence of pleural effusion or pneumothorax.  Bones appear intact.   Impression        No acute process seen.      Electronically signed by: LILLIAN PRUITT MD  Date: 04/13/18  Time: 12:46          Assessment and Plan:     Non-STEMI (non-ST elevated myocardial infarction)    -Patient presents with NSTEMI.  -Had chest pain that started on yesterday.   -Trop of 4.411.   -LVH on ECG with no acute  changes  -Recommend starting Heparin gtt  -Plavix load 600mg daily   -Continue Tridil gtt for now  -ASA, Statin   -No BB due to bradycardia   -Check 2D echo  -Recommend [facility] today for NSTEMI and ongoing chest pain. Dr. Choi explained the risks, benefits and alternatives of the procedure in detail. The patient voices understanding and all questions have been answered. Patient agrees to proceed as planned.  -NS at 75/hr             VTE Risk Mitigation         Ordered     heparin 25,000 units in dextrose 5% 250 mL (100 units/mL) infusion; MALE  Continuous     Route:  Intravenous        04/13/18 1333     heparin 25,000 units in dextrose 5% 250 mL (100 units/mL) bolus from bag; PRN BOLUS  As needed (PRN)     Route:  Intravenous        04/13/18 1333     heparin 25,000 units in dextrose 5% 250 mL (100 units/mL) bolus from bag; PRN BOLUS  As needed (PRN)     Route:  Intravenous        04/13/18 1333        Chart reviewed. Patient examined by Dr. Choi and agrees with plan that has been outlined.     Thank you for your consult. I will follow-up with patient. Please contact us if you have any additional questions.    INDERJIT Auguste  Cardiology   [facility]

## 2018-04-13 NOTE — SUBJECTIVE & OBJECTIVE
Past Medical History:   Diagnosis Date    Hypertension        Past Surgical History:   Procedure Laterality Date    JOINT REPLACEMENT         Review of patient's allergies indicates:  No Known Allergies    Medications:   Medication Details Provider Last Reconciliation Status   atorvastatin (LIPITOR) 40 MG tablet Take 40 mg by mouth once daily. Historical Provider, MD Needs Review   lisinopril (PRINIVIL,ZESTRIL) 20 MG tablet Take 20 mg by mouth once daily. Historical Provider, MD Needs Review   meloxicam (MOBIC) 15 MG tablet Take 15 mg by mouth once daily. Historical Provider, MD Needs Review       Family History     Reviewed and not pertinent.         Social History Main Topics    Smoking status: Current Every Day Smoker    Smokeless tobacco: Not on file    Alcohol use Not on file    Drug use: Unknown    Sexual activity: Not on file     Review of Systems   Constitutional: Positive for diaphoresis. Negative for appetite change, chills, fatigue and fever.   HENT: Negative for congestion, ear pain, mouth sores, sore throat and trouble swallowing.    Eyes: Negative for pain and visual disturbance.   Respiratory: Negative for cough, chest tightness and shortness of breath.    Cardiovascular: Positive for chest pain. Negative for palpitations and leg swelling.   Gastrointestinal: Positive for nausea. Negative for abdominal pain, constipation and diarrhea.   Endocrine: Negative for cold intolerance, heat intolerance, polydipsia and polyuria.   Genitourinary: Negative for dysuria, frequency and hematuria.   Musculoskeletal: Negative for arthralgias, back pain, myalgias and neck pain.   Skin: Negative for pallor, rash and wound.   Allergic/Immunologic: Negative for environmental allergies and immunocompromised state.   Neurological: Negative for dizziness, seizures, syncope, weakness, numbness and headaches.   Hematological: Negative for adenopathy. Does not bruise/bleed easily.   Psychiatric/Behavioral: Negative for  agitation, confusion and sleep disturbance.     Objective:     Vital Signs (Most Recent):  Temp: 98.3 °F (36.8 °C) (04/13/18 1206)  Pulse: (!) 52 (04/13/18 1542)  Resp: 20 (04/13/18 1542)  BP: 133/62 (04/13/18 1542)  SpO2: 95 % (04/13/18 1542) Vital Signs (24h Range):  Temp:  [98.3 °F (36.8 °C)] 98.3 °F (36.8 °C)  Pulse:  [46-69] 52  Resp:  [15-26] 20  SpO2:  [95 %-98 %] 95 %  BP: (126-154)/(62-85) 133/62     Weight: 99.4 kg (219 lb 2.2 oz)  Body mass index is 29.72 kg/m².    Physical Exam   Constitutional: He is oriented to person, place, and time. He appears well-developed and well-nourished.   HENT:   Head: Normocephalic and atraumatic.   Eyes: Conjunctivae are normal.   Neck: Neck supple. No JVD present.   Cardiovascular: Normal rate, regular rhythm and normal heart sounds.    Pulmonary/Chest: Effort normal and breath sounds normal. He has no wheezes.   Abdominal: Soft. Bowel sounds are normal. He exhibits no distension. There is no tenderness.   Musculoskeletal: Normal range of motion.   Neurological: He is alert and oriented to person, place, and time.   Skin: Skin is warm and dry. No rash noted.   Psychiatric: His speech is normal. Thought content normal. His affect is blunt. He is withdrawn.   Nursing note and vitals reviewed.          Significant Labs:   CBC:   Recent Labs  Lab 04/13/18  1234   WBC 15.87*   HGB 14.9   HCT 42.4        CMP:   Recent Labs  Lab 04/13/18  1234      K 3.8      CO2 21*   *   BUN 15   CREATININE 0.8   CALCIUM 9.5   PROT 7.1   ALBUMIN 4.4   BILITOT 0.5   ALKPHOS 84   AST 79*   ALT 32   ANIONGAP 11   EGFRNONAA >60     Troponin:   Recent Labs  Lab 04/13/18  1234   TROPONINI 4.411*     All pertinent labs within the past 24 hours have been reviewed.    Significant Imaging: I have reviewed all pertinent imaging results/findings within the past 24 hours.

## 2018-04-13 NOTE — HPI
Anatoly Polanco is a 52 year old male with hypertension and hyperlipidemia who presented to the ED with complaints of chest pain that began yesterday afternoon while he was working. The patient describes the pain as sharp, located over his entire chest. The pain radiated into his back, bilateral arms and bilateral jaw. There was associated diaphoresis and nausea. Symptoms were improved by a pain pill last night, but were present at their initial intensity this morning. The patient denies vomiting, SOB and fever. He is followed by Dr. Sharma and reports having a stress test within the last two years. He is compliant with his lisinopril and Lipitor. In the ED, the patient was found to have a troponin of 4.411 and bradycardia with nonspecific EKG changes.

## 2018-04-13 NOTE — H&P
[facility] Hospital Medicine  History & Physical    Patient Name: Anatoly Polanco  MRN: [id number]  Admission Date: 4/13/2018  Attending Physician: Edgar Ledbetter Jr., MD   Primary Care Provider: Pool Martinez MD         Patient information was obtained from patient, spouse/SO, past medical records and ER records.     Subjective:     Principal Problem:Non-STEMI (non-ST elevated myocardial infarction)    Chief Complaint:   Chief Complaint   Patient presents with    Chest Pain     with L shoulder/back pain with diaphoresis/nausea since yesterday.         HPI: Anatoly Polanco is a 52 year old male with hypertension and hyperlipidemia who presented to the ED with complaints of chest pain that began yesterday afternoon while he was working. The patient describes the pain as sharp, located over his entire chest. The pain radiated into his back, bilateral arms and bilateral jaw. There was associated diaphoresis and nausea. Symptoms were improved by a pain pill last night, but were present at their initial intensity this morning. The patient denies vomiting, SOB and fever. He is followed by Dr. Sharma and reports having a stress test within the last two years. He is compliant with his lisinopril and Lipitor. In the ED, the patient was found to have a troponin of 4.411 and bradycardia with nonspecific EKG changes.      Past Medical History:   Diagnosis Date    Hypertension        Past Surgical History:   Procedure Laterality Date    JOINT REPLACEMENT         Review of patient's allergies indicates:  No Known Allergies    Medications:   Medication Details Provider Last Reconciliation Status   atorvastatin (LIPITOR) 40 MG tablet Take 40 mg by mouth once daily. Historical Provider, MD Needs Review   lisinopril (PRINIVIL,ZESTRIL) 20 MG tablet Take 20 mg by mouth once daily. Historical Provider, MD Needs Review   meloxicam (MOBIC) 15 MG tablet Take 15 mg by mouth once daily. Historical Provider, MD Needs  Review       Family History     Reviewed and not pertinent.         Social History Main Topics    Smoking status: Current Every Day Smoker    Smokeless tobacco: Not on file    Alcohol use Not on file    Drug use: Unknown    Sexual activity: Not on file     Review of Systems   Constitutional: Positive for diaphoresis. Negative for appetite change, chills, fatigue and fever.   HENT: Negative for congestion, ear pain, mouth sores, sore throat and trouble swallowing.    Eyes: Negative for pain and visual disturbance.   Respiratory: Negative for cough, chest tightness and shortness of breath.    Cardiovascular: Positive for chest pain. Negative for palpitations and leg swelling.   Gastrointestinal: Positive for nausea. Negative for abdominal pain, constipation and diarrhea.   Endocrine: Negative for cold intolerance, heat intolerance, polydipsia and polyuria.   Genitourinary: Negative for dysuria, frequency and hematuria.   Musculoskeletal: Negative for arthralgias, back pain, myalgias and neck pain.   Skin: Negative for pallor, rash and wound.   Allergic/Immunologic: Negative for environmental allergies and immunocompromised state.   Neurological: Negative for dizziness, seizures, syncope, weakness, numbness and headaches.   Hematological: Negative for adenopathy. Does not bruise/bleed easily.   Psychiatric/Behavioral: Negative for agitation, confusion and sleep disturbance.     Objective:     Vital Signs (Most Recent):  Temp: 98.3 °F (36.8 °C) (04/13/18 1206)  Pulse: (!) 52 (04/13/18 1542)  Resp: 20 (04/13/18 1542)  BP: 133/62 (04/13/18 1542)  SpO2: 95 % (04/13/18 1542) Vital Signs (24h Range):  Temp:  [98.3 °F (36.8 °C)] 98.3 °F (36.8 °C)  Pulse:  [46-69] 52  Resp:  [15-26] 20  SpO2:  [95 %-98 %] 95 %  BP: (126-154)/(62-85) 133/62     Weight: 99.4 kg (219 lb 2.2 oz)  Body mass index is 29.72 kg/m².    Physical Exam   Constitutional: He is oriented to person, place, and time. He appears well-developed and  well-nourished.   HENT:   Head: Normocephalic and atraumatic.   Eyes: Conjunctivae are normal.   Neck: Neck supple. No JVD present.   Cardiovascular: Normal rate, regular rhythm and normal heart sounds.    Pulmonary/Chest: Effort normal and breath sounds normal. He has no wheezes.   Abdominal: Soft. Bowel sounds are normal. He exhibits no distension. There is no tenderness.   Musculoskeletal: Normal range of motion.   Neurological: He is alert and oriented to person, place, and time.   Skin: Skin is warm and dry. No rash noted.   Psychiatric: His speech is normal. Thought content normal. His affect is blunt. He is withdrawn.   Nursing note and vitals reviewed.          Significant Labs:   CBC:   Recent Labs  Lab 04/13/18  1234   WBC 15.87*   HGB 14.9   HCT 42.4        CMP:   Recent Labs  Lab 04/13/18  1234      K 3.8      CO2 21*   *   BUN 15   CREATININE 0.8   CALCIUM 9.5   PROT 7.1   ALBUMIN 4.4   BILITOT 0.5   ALKPHOS 84   AST 79*   ALT 32   ANIONGAP 11   EGFRNONAA >60     Troponin:   Recent Labs  Lab 04/13/18  1234   TROPONINI 4.411*     All pertinent labs within the past 24 hours have been reviewed.    Significant Imaging: I have reviewed all pertinent imaging results/findings within the past 24 hours.    Assessment/Plan:     * Non-STEMI (non-ST elevated myocardial infarction)    -ASA daily.   -Heparin and Tridil drips.   -Cardiology consult.   -Trend troponins.   -Continue lisinopril and statin.   -Hold beta-blockers due to asymptomatic bradycardia.    -Patient evaluated for Cardiac rehab and is not a candidate at this time. He will be reevaluated and referred when appropriate.         Leukocytosis    -Likely reactive.   -No signs or symptoms of infection.   -Monitor.           Hypertension    Continue lisinopril.           Tobacco abuse    -Patient counseled on cessation.   -Declined nicotine patch.             VTE Risk Mitigation         Ordered     IP VTE HIGH RISK PATIENT  Once       04/13/18 1614     Reason for No Pharmacological VTE Prophylaxis  Once      04/13/18 1614     heparin 25,000 units in dextrose 5% 250 mL (100 units/mL) infusion; MALE  Continuous     Route:  Intravenous        04/13/18 1333     heparin 25,000 units in dextrose 5% 250 mL (100 units/mL) bolus from bag; PRN BOLUS  As needed (PRN)     Route:  Intravenous        04/13/18 1333     heparin 25,000 units in dextrose 5% 250 mL (100 units/mL) bolus from bag; PRN BOLUS  As needed (PRN)     Route:  Intravenous        04/13/18 1333             DONAVAN Mayfield  Department of Hospital Medicine   [facility]

## 2018-04-13 NOTE — HPI
Mr. Polanco presents to the ED with complaints of chest pain. Patient states that he was at work on yesterday and became very diaphoretic. Went inside to cool off, but still was very diaphoretic. Denies any associated SOB, but was nauseous.  States that he went home and rested. Chest pain continued this morning. 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 Presented to the ED when symptoms didn't improve. EKG upon arrival showed no evidence of STEMI. Did show LVH. Initial troponin 4.411. Patient states that he is still having chest pain at time of consult, 7/10. Has been started on Heparin gtt and Tridil. BP stable. Denies any family history of CAD.

## 2018-04-13 NOTE — SUBJECTIVE & OBJECTIVE
Past Medical History:   Diagnosis Date    Hypertension        Past Surgical History:   Procedure Laterality Date    JOINT REPLACEMENT         Review of patient's allergies indicates:  No Known Allergies    No current facility-administered medications on file prior to encounter.      No current outpatient prescriptions on file prior to encounter.     Family History     None        Social History Main Topics    Smoking status: Current Every Day Smoker    Smokeless tobacco: Not on file    Alcohol use Not on file    Drug use: Unknown    Sexual activity: Not on file     Review of Systems   Constitution: Negative for diaphoresis, weakness, malaise/fatigue, weight gain and weight loss.   HENT: Negative for congestion and nosebleeds.    Cardiovascular: Positive for chest pain. Negative for claudication, cyanosis, dyspnea on exertion, irregular heartbeat, leg swelling, near-syncope, orthopnea, palpitations, paroxysmal nocturnal dyspnea and syncope.   Respiratory: Negative for cough, hemoptysis, shortness of breath, sleep disturbances due to breathing, snoring, sputum production and wheezing.    Hematologic/Lymphatic: Negative for bleeding problem. Does not bruise/bleed easily.   Skin: Negative for rash.   Musculoskeletal: Negative for arthritis, back pain, falls, joint pain, muscle cramps and muscle weakness.   Gastrointestinal: Positive for nausea. Negative for abdominal pain, constipation, diarrhea, heartburn, hematemesis, hematochezia and melena.   Genitourinary: Negative for dysuria, hematuria and nocturia.   Neurological: Negative for excessive daytime sleepiness, dizziness, headaches, light-headedness, loss of balance, numbness and vertigo.     Objective:     Vital Signs (Most Recent):  Temp: 98.3 °F (36.8 °C) (04/13/18 1206)  Pulse: 64 (04/13/18 1502)  Resp: 15 (04/13/18 1502)  BP: (!) 144/85 (04/13/18 1502)  SpO2: 96 % (04/13/18 1502) Vital Signs (24h Range):  Temp:  [98.3 °F (36.8 °C)] 98.3 °F (36.8  °C)  Pulse:  [46-69] 64  Resp:  [15-22] 15  SpO2:  [95 %-98 %] 96 %  BP: (126-152)/(64-85) 144/85     Weight: 99.4 kg (219 lb 2.2 oz)  Body mass index is 29.72 kg/m².    SpO2: 96 %  O2 Device (Oxygen Therapy): nasal cannula w/ humidification    No intake or output data in the 24 hours ending 04/13/18 1529    Lines/Drains/Airways     Peripheral Intravenous Line                 Peripheral IV - Single Lumen 04/13/18 1234 Right Antecubital less than 1 day         Peripheral IV - Single Lumen 04/13/18 1510 Right Wrist less than 1 day                Physical Exam   Constitutional: He is oriented to person, place, and time. He appears well-developed and well-nourished.   Neck: Neck supple. No JVD present.   Cardiovascular: Normal rate, regular rhythm and normal pulses.  Exam reveals no friction rub.    Murmur heard.  Pulmonary/Chest: Effort normal and breath sounds normal. No respiratory distress. He has no wheezes. He has no rales.   Abdominal: Soft. Bowel sounds are normal. He exhibits no distension.   Musculoskeletal: He exhibits no edema or tenderness.   Neurological: He is alert and oriented to person, place, and time.   Skin: Skin is warm and dry. No rash noted.   Psychiatric: He has a normal mood and affect. His behavior is normal.   Nursing note and vitals reviewed.      Significant Labs:   All pertinent lab results from the last 24 hours have been reviewed. and   Recent Lab Results       04/13/18  1234      Albumin 4.4     Alkaline Phosphatase 84     ALT 32     Anion Gap 11     aPTT 25.7  Comment:  aPTT therapeutic range = 39-69 seconds     AST 79(H)     Baso # 0.02     Basophil% 0.1     Total Bilirubin 0.5  Comment:  For infants and newborns, interpretation of results should be based  on gestational age, weight and in agreement with clinical  observations.  Premature Infant recommended reference ranges:  Up to 24 hours.............<8.0 mg/dL  Up to 48 hours............<12.0 mg/dL  3-5 days..................<15.0  mg/dL  6-29 days.................<15.0 mg/dL       BUN, Bld 15     Calcium 9.5     Chloride 105     CO2 21(L)     Creatinine 0.8     D-Dimer 0.27  Comment:  The quantitative D-dimer assay should be used as an aid in   the diagnosis of deep vein thrombosis and pulmonary embolism  in patients with the appropriate presentation and clinical  history. The upper limit of the reference interval and the clinical   cut off   point are identical. Causes of a positive (>0.50 mg/L FEU) D-Dimer   test  include, but are not limited to: DVT, PE, DIC, thrombolytic   therapy, anticoagulant therapy, recent surgery, trauma, or   pregnancy, disseminated malignancy, aortic aneurysm, cirrhosis,  and severe infection. False negative results may occur in   patients with distal DVT.       Differential Method Automated     eGFR if  >60     eGFR if non  >60  Comment:  Calculation used to obtain the estimated glomerular filtration  rate (eGFR) is the CKD-EPI equation.        Eos # 0.1     Eosinophil% 0.8     Glucose 118(H)     Gran # (ANC) 12.6(H)     Gran% 79.7(H)     Hematocrit 42.4     Hemoglobin 14.9     Coumadin Monitoring INR 1.0  Comment:  Coumadin Therapy:  2.0 - 3.0 for INR for all indicators except mechanical heart valves  and antiphospholipid syndromes which should use 2.5 - 3.5.       Lymph # 1.9     Lymph% 12.2(L)     MCH 32.1(H)     MCHC 35.1     MCV 91     Mono # 1.2(H)     Mono% 7.2     MPV 10.4     Platelets 241     Potassium 3.8     Total Protein 7.1     Protime 10.7     RBC 4.64     RDW 12.9     Sodium 137     Troponin I 4.411  Comment:  The reference interval for Troponin I represents the 99th percentile   cutoff   for our facility and is consistent with 3rd generation assay   performance.  (H)     WBC 15.87(H)           Significant Imaging: Echocardiogram: 2D echo with color flow doppler Pending and X-Ray: CXR: X-Ray Chest 1 View   Narrative     Clinical Data:Chest pain    Comparison:   none    Findings:  Single view of the chest.      Cardiac silhouette is normal.  The lungs demonstrate no evidence of active disease.  No evidence of pleural effusion or pneumothorax.  Bones appear intact.   Impression        No acute process seen.      Electronically signed by: LILLIAN PRUITT MD  Date: 04/13/18  Time: 12:46

## 2018-04-13 NOTE — OP NOTE
[facility] -   Cardiac Catheterization  Procedure Note    SUMMARY     Anatoly Polanco  [id number]  Pool Martinez MD    Date of Procedure: 4/13/2018    Procedure:  1.  LHC  2. LEFT VENTRICULOGRAM  3. CORONARY ANGIOGRAM  4.  PCI OCCLUDED DISTAL RCA FRANCI X ONE    Provider: Je Choi MD    Assisting Provider: Moy Siu    Indications: He was referred for cardiac catheterization to further evaluate NSTEMI.    Pre-Procedure Diagnosis:  NSTEMI    Post-Procedure Diagnosis:  Same + PCI occluded distal RCA    Anesthesia: RN IV Sedation    Description of the Findings of the Procedure:     The risks, benefits, complications, treatment options, and expected outcomes were discussed with the patient. The patient and/or family concurred with the proposed plan, giving informed consent. Patient was brought to the cath lab after IV hydration was begun and oral premedication was given.    Findings:    PCI occluded distal RCA Stent Resolute FRANCI x one  Nonobstructive disease elsewhere  LVEF 70%  Right femoral approach/manual pressure  See report    Complications: None; patient tolerated the procedure well.    Estimated Blood Loss (EBL): less than 50 mL           Implants: FRANCI x one    Specimens: none    Condition: stable    Disposition: PACU - hemodynamically stable.    Attestation: I was present and scrubbed for the entire procedure.     RECOMMENDATIONS:    USUAL POST PCI CARE  ASA  EFFIENT   OPTIMIZE MEDICAL TX FOR CAD  RISK FACTOR MODIFICATIONS  TOBACCO CESSATION

## 2018-04-13 NOTE — ASSESSMENT & PLAN NOTE
-Patient presents with NSTEMI.  -Had chest pain that started on yesterday.   -Trop of 4.411.   -LVH on ECG with no acute changes  -Recommend starting Heparin gtt  -Plavix load 600mg daily   -Continue Tridil gtt for now  -ASA, Statin   -No BB due to bradycardia   -Check 2D echo  -Recommend [facility] today for NSTEMI and ongoing chest pain. Dr. Choi explained the risks, benefits and alternatives of the procedure in detail. The patient voices understanding and all questions have been answered. Patient agrees to proceed as planned.  -NS at 75/hr

## 2018-04-14 VITALS
SYSTOLIC BLOOD PRESSURE: 135 MMHG | HEIGHT: 72 IN | TEMPERATURE: 99 F | DIASTOLIC BLOOD PRESSURE: 75 MMHG | OXYGEN SATURATION: 99 % | WEIGHT: 219.13 LBS | HEART RATE: 50 BPM | BODY MASS INDEX: 29.68 KG/M2 | RESPIRATION RATE: 18 BRPM

## 2018-04-14 PROBLEM — I21.4 NON-STEMI (NON-ST ELEVATED MYOCARDIAL INFARCTION): Status: RESOLVED | Noted: 2018-04-13 | Resolved: 2018-04-14

## 2018-04-14 PROBLEM — D72.829 LEUKOCYTOSIS: Status: RESOLVED | Noted: 2018-04-13 | Resolved: 2018-04-14

## 2018-04-14 LAB
ALBUMIN SERPL BCP-MCNC: 3.9 G/DL
ALP SERPL-CCNC: 77 U/L
ALT SERPL W/O P-5'-P-CCNC: 35 U/L
ANION GAP SERPL CALC-SCNC: 10 MMOL/L
AST SERPL-CCNC: 97 U/L
BASOPHILS # BLD AUTO: 0.01 K/UL
BASOPHILS NFR BLD: 0.1 %
BILIRUB SERPL-MCNC: 0.9 MG/DL
BUN SERPL-MCNC: 9 MG/DL
CALCIUM SERPL-MCNC: 8.9 MG/DL
CHLORIDE SERPL-SCNC: 104 MMOL/L
CO2 SERPL-SCNC: 22 MMOL/L
CREAT SERPL-MCNC: 0.8 MG/DL
DIFFERENTIAL METHOD: ABNORMAL
EOSINOPHIL # BLD AUTO: 0.1 K/UL
EOSINOPHIL NFR BLD: 0.9 %
ERYTHROCYTE [DISTWIDTH] IN BLOOD BY AUTOMATED COUNT: 13 %
EST. GFR  (AFRICAN AMERICAN): >60 ML/MIN/1.73 M^2
EST. GFR  (NON AFRICAN AMERICAN): >60 ML/MIN/1.73 M^2
GLUCOSE SERPL-MCNC: 169 MG/DL
HCT VFR BLD AUTO: 40.7 %
HGB BLD-MCNC: 13.9 G/DL
INR PPP: 1
LYMPHOCYTES # BLD AUTO: 1.9 K/UL
LYMPHOCYTES NFR BLD: 16.6 %
MCH RBC QN AUTO: 31.7 PG
MCHC RBC AUTO-ENTMCNC: 34.2 G/DL
MCV RBC AUTO: 93 FL
MONOCYTES # BLD AUTO: 1.1 K/UL
MONOCYTES NFR BLD: 9.4 %
NEUTROPHILS # BLD AUTO: 8.4 K/UL
NEUTROPHILS NFR BLD: 73 %
PLATELET # BLD AUTO: 226 K/UL
PMV BLD AUTO: 10.7 FL
POTASSIUM SERPL-SCNC: 3.7 MMOL/L
PROT SERPL-MCNC: 6.5 G/DL
PROTHROMBIN TIME: 10.7 SEC
RBC # BLD AUTO: 4.38 M/UL
SODIUM SERPL-SCNC: 136 MMOL/L
WBC # BLD AUTO: 11.54 K/UL

## 2018-04-14 PROCEDURE — 25000003 PHARM REV CODE 250: Performed by: INTERNAL MEDICINE

## 2018-04-14 PROCEDURE — 85610 PROTHROMBIN TIME: CPT

## 2018-04-14 PROCEDURE — 85025 COMPLETE CBC W/AUTO DIFF WBC: CPT

## 2018-04-14 PROCEDURE — 93010 ELECTROCARDIOGRAM REPORT: CPT | Mod: ,,, | Performed by: INTERNAL MEDICINE

## 2018-04-14 PROCEDURE — 93005 ELECTROCARDIOGRAM TRACING: CPT

## 2018-04-14 PROCEDURE — 80053 COMPREHEN METABOLIC PANEL: CPT

## 2018-04-14 PROCEDURE — 99233 SBSQ HOSP IP/OBS HIGH 50: CPT | Mod: ,,, | Performed by: INTERNAL MEDICINE

## 2018-04-14 PROCEDURE — 25000003 PHARM REV CODE 250: Performed by: EMERGENCY MEDICINE

## 2018-04-14 PROCEDURE — 63600175 PHARM REV CODE 636 W HCPCS: Mod: JG | Performed by: INTERNAL MEDICINE

## 2018-04-14 PROCEDURE — 36415 COLL VENOUS BLD VENIPUNCTURE: CPT

## 2018-04-14 RX ORDER — ATORVASTATIN CALCIUM 80 MG/1
80 TABLET, FILM COATED ORAL NIGHTLY
Qty: 90 TABLET | Refills: 3 | Status: SHIPPED | OUTPATIENT
Start: 2018-04-14 | End: 2018-04-14

## 2018-04-14 RX ORDER — ASPIRIN 81 MG/1
81 TABLET ORAL DAILY
Qty: 30 TABLET | Refills: 0 | Status: SHIPPED | OUTPATIENT
Start: 2018-04-15 | End: 2019-04-15

## 2018-04-14 RX ORDER — ATORVASTATIN CALCIUM 80 MG/1
80 TABLET, FILM COATED ORAL NIGHTLY
Qty: 90 TABLET | Refills: 3 | Status: SHIPPED | OUTPATIENT
Start: 2018-04-14 | End: 2019-04-14

## 2018-04-14 RX ORDER — PRASUGREL 10 MG/1
10 TABLET, FILM COATED ORAL DAILY
Qty: 30 TABLET | Refills: 11 | Status: SHIPPED | OUTPATIENT
Start: 2018-04-15 | End: 2019-04-15

## 2018-04-14 RX ORDER — PRASUGREL 10 MG/1
10 TABLET, FILM COATED ORAL DAILY
Qty: 30 TABLET | Refills: 11 | Status: SHIPPED | OUTPATIENT
Start: 2018-04-15 | End: 2018-04-14

## 2018-04-14 RX ORDER — METOPROLOL TARTRATE 25 MG/1
25 TABLET, FILM COATED ORAL 2 TIMES DAILY
Qty: 60 TABLET | Refills: 11 | Status: SHIPPED | OUTPATIENT
Start: 2018-04-14 | End: 2019-04-14

## 2018-04-14 RX ORDER — METOPROLOL TARTRATE 25 MG/1
25 TABLET, FILM COATED ORAL 2 TIMES DAILY
Qty: 60 TABLET | Refills: 11 | Status: SHIPPED | OUTPATIENT
Start: 2018-04-14 | End: 2018-04-14

## 2018-04-14 RX ORDER — ASPIRIN 81 MG/1
81 TABLET ORAL DAILY
Refills: 0 | COMMUNITY
Start: 2018-04-15 | End: 2018-04-14

## 2018-04-14 RX ADMIN — METOPROLOL TARTRATE 25 MG: 25 TABLET ORAL at 09:04

## 2018-04-14 RX ADMIN — TIROFIBAN 0.15 MCG/KG/MIN: 5 INJECTION, SOLUTION INTRAVENOUS at 03:04

## 2018-04-14 RX ADMIN — PRASUGREL 10 MG: 10 TABLET, FILM COATED ORAL at 09:04

## 2018-04-14 RX ADMIN — ASPIRIN 81 MG: 81 TABLET, COATED ORAL at 09:04

## 2018-04-14 RX ADMIN — FAMOTIDINE 20 MG: 20 TABLET ORAL at 09:04

## 2018-04-14 NOTE — SUBJECTIVE & OBJECTIVE
Review of Systems   Constitution: Negative for diaphoresis, weakness, malaise/fatigue, weight gain and weight loss.   HENT: Negative for congestion and nosebleeds.    Cardiovascular: Negative for chest pain, claudication, cyanosis, dyspnea on exertion, irregular heartbeat, leg swelling, near-syncope, orthopnea, palpitations, paroxysmal nocturnal dyspnea and syncope.   Respiratory: Negative for cough, hemoptysis, shortness of breath, sleep disturbances due to breathing, snoring, sputum production and wheezing.    Hematologic/Lymphatic: Negative for bleeding problem. Does not bruise/bleed easily.   Skin: Negative for rash.   Musculoskeletal: Negative for arthritis, back pain, falls, joint pain, muscle cramps and muscle weakness.   Gastrointestinal: Negative for abdominal pain, constipation, diarrhea, heartburn, hematemesis, hematochezia, melena and nausea.   Genitourinary: Negative for dysuria, hematuria and nocturia.   Neurological: Negative for excessive daytime sleepiness, dizziness, headaches, light-headedness, loss of balance, numbness and vertigo.     Objective:     Vital Signs (Most Recent):  Temp: 99 °F (37.2 °C) (04/14/18 0700)  Pulse: (!) 50 (04/14/18 1000)  Resp: 18 (04/14/18 1000)  BP: 135/75 (04/14/18 1000)  SpO2: 99 % (04/14/18 1000) Vital Signs (24h Range):  Temp:  [98.3 °F (36.8 °C)-99 °F (37.2 °C)] 99 °F (37.2 °C)  Pulse:  [46-69] 50  Resp:  [11-26] 18  SpO2:  [94 %-99 %] 99 %  BP: (102-154)/() 135/75     Weight: 99.4 kg (219 lb 2.2 oz)  Body mass index is 29.72 kg/m².     SpO2: 99 %  O2 Device (Oxygen Therapy): room air      Intake/Output Summary (Last 24 hours) at 04/14/18 1045  Last data filed at 04/14/18 0600   Gross per 24 hour   Intake          1021.91 ml   Output             1000 ml   Net            21.91 ml       Lines/Drains/Airways     Peripheral Intravenous Line                 Peripheral IV - Single Lumen 04/13/18 1234 Right Antecubital less than 1 day         Peripheral IV -  Single Lumen 04/13/18 1510 Right Wrist less than 1 day                Physical Exam   Constitutional: He is oriented to person, place, and time. He appears well-developed and well-nourished.   Neck: Neck supple. No JVD present.   Cardiovascular: Normal rate, regular rhythm, normal heart sounds and normal pulses.  Exam reveals no friction rub.    No murmur heard.  Pulmonary/Chest: Effort normal and breath sounds normal. No respiratory distress. He has no wheezes. He has no rales.   Abdominal: Soft. Bowel sounds are normal. He exhibits no distension.   Musculoskeletal: He exhibits no edema or tenderness.   Neurological: He is alert and oriented to person, place, and time.   Skin: Skin is warm and dry. No rash noted.   Right groin access site without redness, tenderness, swelling, or drainage. There is no active external bleeding or hematoma.    Psychiatric: He has a normal mood and affect. His behavior is normal.   Nursing note and vitals reviewed.      Significant Labs:   All pertinent lab results from the last 24 hours have been reviewed. and   Recent Lab Results       04/14/18  0431 04/13/18  2106 04/13/18  1754 04/13/18  1741 04/13/18  1710      Albumin 3.9         Alkaline Phosphatase 77         ALT 35         Anion Gap 10         aPTT          AST 97(H)         Baso # 0.01          0.01          0.01         Basophil% 0.1          0.1          0.1         Total Bilirubin 0.9  Comment:  For infants and newborns, interpretation of results should be based  on gestational age, weight and in agreement with clinical  observations.  Premature Infant recommended reference ranges:  Up to 24 hours.............<8.0 mg/dL  Up to 48 hours............<12.0 mg/dL  3-5 days..................<15.0 mg/dL  6-29 days.................<15.0 mg/dL           BUN, Bld 9         Calcium 8.9         Chloride 104         CO2 22(L)         Coronary Stenosis          Coronary Stent          Creatinine 0.8         D-Dimer          Diastolic  Dysfunction          Differential Method Automated          Automated          Automated         EF          eGFR if  >60         eGFR if non  >60  Comment:  Calculation used to obtain the estimated glomerular filtration  rate (eGFR) is the CKD-EPI equation.            Eos # 0.1          0.1          0.1         Eosinophil% 0.9          0.9          0.9         Glucose 169(H)         Gran # (ANC) 8.4(H)          8.4(H)          8.4(H)         Gran% 73.0          73.0          73.0         Hematocrit 40.7          40.7          40.7         Hemoglobin 13.9(L)          13.9(L)          13.9(L)         Coumadin Monitoring INR 1.0  Comment:  Coumadin Therapy:  2.0 - 3.0 for INR for all indicators except mechanical heart valves  and antiphospholipid syndromes which should use 2.5 - 3.5.           Lymph # 1.9          1.9          1.9         Lymph% 16.6(L)          16.6(L)          16.6(L)         MCH 31.7(H)          31.7(H)          31.7(H)         MCHC 34.2          34.2          34.2         MCV 93          93          93         Mono # 1.1(H)          1.1(H)          1.1(H)         Mono% 9.4          9.4          9.4         MPV 10.7          10.7          10.7          10.7         Platelets 226          226          226          226         POC ACTIVATED CLOTTING TIME K  98 136 142(H) 323(H)     Potassium 3.7         Total Protein 6.5         Protime 10.7         RBC 4.38(L)          4.38(L)          4.38(L)         RDW 13.0          13.0          13.0         Sample  unknown unknown unknown unknown     Sodium 136         Troponin I          WBC 11.54          11.54          11.54                     04/13/18  1700 04/13/18  1653 04/13/18  1629 04/13/18  1510 04/13/18  1234      Albumin     4.4     Alkaline Phosphatase     84     ALT     32     Anion Gap     11     aPTT     25.7  Comment:  aPTT therapeutic range = 39-69 seconds     AST     79(H)     Baso #     0.02     Basophil%      0.1     Total Bilirubin     0.5  Comment:  For infants and newborns, interpretation of results should be based  on gestational age, weight and in agreement with clinical  observations.  Premature Infant recommended reference ranges:  Up to 24 hours.............<8.0 mg/dL  Up to 48 hours............<12.0 mg/dL  3-5 days..................<15.0 mg/dL  6-29 days.................<15.0 mg/dL       BUN, Bld     15     Calcium     9.5     Chloride     105     CO2     21(L)     Coronary Stenosis >= 50%(A)         Coronary Stent Yes(A)         Creatinine     0.8     D-Dimer     0.27  Comment:  The quantitative D-dimer assay should be used as an aid in   the diagnosis of deep vein thrombosis and pulmonary embolism  in patients with the appropriate presentation and clinical  history. The upper limit of the reference interval and the clinical   cut off   point are identical. Causes of a positive (>0.50 mg/L FEU) D-Dimer   test  include, but are not limited to: DVT, PE, DIC, thrombolytic   therapy, anticoagulant therapy, recent surgery, trauma, or   pregnancy, disseminated malignancy, aortic aneurysm, cirrhosis,  and severe infection. False negative results may occur in   patients with distal DVT.       Diastolic Dysfunction    No      Differential Method     Automated     EF    60      eGFR if      >60     eGFR if non      >60  Comment:  Calculation used to obtain the estimated glomerular filtration  rate (eGFR) is the CKD-EPI equation.        Eos #     0.1     Eosinophil%     0.8     Glucose     118(H)     Gran # (ANC)     12.6(H)     Gran%     79.7(H)     Hematocrit     42.4     Hemoglobin     14.9     Coumadin Monitoring INR     1.0  Comment:  Coumadin Therapy:  2.0 - 3.0 for INR for all indicators except mechanical heart valves  and antiphospholipid syndromes which should use 2.5 - 3.5.       Lymph #     1.9     Lymph%     12.2(L)     MCH     32.1(H)     MCHC     35.1     MCV     91     Mono #      1.2(H)     Mono%     7.2     MPV     10.4     Platelets     241     POC ACTIVATED CLOTTING TIME K  362(H) 125       Potassium     3.8     Total Protein     7.1     Protime     10.7     RBC     4.64     RDW     12.9     Sample  unknown unknown       Sodium     137     Troponin I     4.411  Comment:  The reference interval for Troponin I represents the 99th percentile   cutoff   for our facility and is consistent with 3rd generation assay   performance.  (H)     WBC     15.87(H)                     Significant Imaging: Echocardiogram:   2D echo with color flow doppler:   Results for orders placed or performed during the hospital encounter of 04/13/18   2D echo with color flow doppler   Result Value Ref Range    EF 60 55 - 65    Diastolic Dysfunction No     and X-Ray: CXR: X-Ray Chest 1 View (CXR): No results found for this visit on 04/13/18. and X-Ray Chest PA and Lateral (CXR): No results found for this visit on 04/13/18.

## 2018-04-14 NOTE — PROGRESS NOTES
[facility] -   Cardiology  Progress Note    Patient Name: Anatoly Polanco  MRN: [id number]  Admission Date: 4/13/2018  Hospital Length of Stay: 1 days  Code Status: Full Code   Attending Physician: Gem Bartholomew MD   Primary Care Physician: Pool Martinez MD  Expected Discharge Date: 4/14/2018  Principal Problem:NSTEMI (non-ST elevated myocardial infarction)    Subjective:   Brief HPI:  Mr. Polanco presents to the ED with complaints of chest pain. Patient states that he was at work on yesterday and became very diaphoretic. Went inside to cool off, but still was very diaphoretic. Denies any associated SOB, but was nauseous.  States that he went home and rested. Chest pain continued this morning. 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 Presented to the ED when symptoms didn't improve. EKG upon arrival showed no evidence of STEMI. Did show LVH. Initial troponin 4.411. Patient states that he is still having chest pain at time of consult, 7/10. Has been started on Heparin gtt and Tridil. BP stable. Denies any family history of CAD. Doesn't smoke or drink ETOH       Hospital Course:   4/14/18- patient is post angiogram on yesterday for NSTEMI. Had successful PCI of occluded RCA. Tolerated procedure well. No chest pain or angina overnight. Labs and vitals stable today . EF 60% on echo        Review of Systems   Constitution: Negative for diaphoresis, weakness, malaise/fatigue, weight gain and weight loss.   HENT: Negative for congestion and nosebleeds.    Cardiovascular: Negative for chest pain, claudication, cyanosis, dyspnea on exertion, irregular heartbeat, leg swelling, near-syncope, orthopnea, palpitations, paroxysmal nocturnal dyspnea and syncope.   Respiratory: Negative for cough, hemoptysis, shortness of breath, sleep disturbances due to breathing, snoring, sputum production and wheezing.    Hematologic/Lymphatic: Negative for  bleeding problem. Does not bruise/bleed easily.   Skin: Negative for rash.   Musculoskeletal: Negative for arthritis, back pain, falls, joint pain, muscle cramps and muscle weakness.   Gastrointestinal: Negative for abdominal pain, constipation, diarrhea, heartburn, hematemesis, hematochezia, melena and nausea.   Genitourinary: Negative for dysuria, hematuria and nocturia.   Neurological: Negative for excessive daytime sleepiness, dizziness, headaches, light-headedness, loss of balance, numbness and vertigo.     Objective:     Vital Signs (Most Recent):  Temp: 99 °F (37.2 °C) (04/14/18 0700)  Pulse: (!) 50 (04/14/18 1000)  Resp: 18 (04/14/18 1000)  BP: 135/75 (04/14/18 1000)  SpO2: 99 % (04/14/18 1000) Vital Signs (24h Range):  Temp:  [98.3 °F (36.8 °C)-99 °F (37.2 °C)] 99 °F (37.2 °C)  Pulse:  [46-69] 50  Resp:  [11-26] 18  SpO2:  [94 %-99 %] 99 %  BP: (102-154)/() 135/75     Weight: 99.4 kg (219 lb 2.2 oz)  Body mass index is 29.72 kg/m².     SpO2: 99 %  O2 Device (Oxygen Therapy): room air      Intake/Output Summary (Last 24 hours) at 04/14/18 1045  Last data filed at 04/14/18 0600   Gross per 24 hour   Intake          1021.91 ml   Output             1000 ml   Net            21.91 ml       Lines/Drains/Airways     Peripheral Intravenous Line                 Peripheral IV - Single Lumen 04/13/18 1234 Right Antecubital less than 1 day         Peripheral IV - Single Lumen 04/13/18 1510 Right Wrist less than 1 day                Physical Exam   Constitutional: He is oriented to person, place, and time. He appears well-developed and well-nourished.   Neck: Neck supple. No JVD present.   Cardiovascular: Normal rate, regular rhythm, normal heart sounds and normal pulses.  Exam reveals no friction rub.    No murmur heard.  Pulmonary/Chest: Effort normal and breath sounds normal. No respiratory distress. He has no wheezes. He has no rales.   Abdominal: Soft. Bowel sounds are normal. He exhibits no distension.    Musculoskeletal: He exhibits no edema or tenderness.   Neurological: He is alert and oriented to person, place, and time.   Skin: Skin is warm and dry. No rash noted.   Right groin access site without redness, tenderness, swelling, or drainage. There is no active external bleeding or hematoma.    Psychiatric: He has a normal mood and affect. His behavior is normal.   Nursing note and vitals reviewed.      Significant Labs:   All pertinent lab results from the last 24 hours have been reviewed. and   Recent Lab Results       04/14/18  0431 04/13/18  2106 04/13/18  1754 04/13/18  1741 04/13/18  1710      Albumin 3.9         Alkaline Phosphatase 77         ALT 35         Anion Gap 10         aPTT          AST 97(H)         Baso # 0.01          0.01          0.01         Basophil% 0.1          0.1          0.1         Total Bilirubin 0.9  Comment:  For infants and newborns, interpretation of results should be based  on gestational age, weight and in agreement with clinical  observations.  Premature Infant recommended reference ranges:  Up to 24 hours.............<8.0 mg/dL  Up to 48 hours............<12.0 mg/dL  3-5 days..................<15.0 mg/dL  6-29 days.................<15.0 mg/dL           BUN, Bld 9         Calcium 8.9         Chloride 104         CO2 22(L)         Coronary Stenosis          Coronary Stent          Creatinine 0.8         D-Dimer          Diastolic Dysfunction          Differential Method Automated          Automated          Automated         EF          eGFR if  >60         eGFR if non  >60  Comment:  Calculation used to obtain the estimated glomerular filtration  rate (eGFR) is the CKD-EPI equation.            Eos # 0.1          0.1          0.1         Eosinophil% 0.9          0.9          0.9         Glucose 169(H)         Gran # (ANC) 8.4(H)          8.4(H)          8.4(H)         Gran% 73.0          73.0          73.0         Hematocrit 40.7          40.7           40.7         Hemoglobin 13.9(L)          13.9(L)          13.9(L)         Coumadin Monitoring INR 1.0  Comment:  Coumadin Therapy:  2.0 - 3.0 for INR for all indicators except mechanical heart valves  and antiphospholipid syndromes which should use 2.5 - 3.5.           Lymph # 1.9          1.9          1.9         Lymph% 16.6(L)          16.6(L)          16.6(L)         MCH 31.7(H)          31.7(H)          31.7(H)         MCHC 34.2          34.2          34.2         MCV 93          93          93         Mono # 1.1(H)          1.1(H)          1.1(H)         Mono% 9.4          9.4          9.4         MPV 10.7          10.7          10.7          10.7         Platelets 226          226          226          226         POC ACTIVATED CLOTTING TIME K  98 136 142(H) 323(H)     Potassium 3.7         Total Protein 6.5         Protime 10.7         RBC 4.38(L)          4.38(L)          4.38(L)         RDW 13.0          13.0          13.0         Sample  unknown unknown unknown unknown     Sodium 136         Troponin I          WBC 11.54          11.54          11.54                     04/13/18  1700 04/13/18  1653 04/13/18  1629 04/13/18  1510 04/13/18  1234      Albumin     4.4     Alkaline Phosphatase     84     ALT     32     Anion Gap     11     aPTT     25.7  Comment:  aPTT therapeutic range = 39-69 seconds     AST     79(H)     Baso #     0.02     Basophil%     0.1     Total Bilirubin     0.5  Comment:  For infants and newborns, interpretation of results should be based  on gestational age, weight and in agreement with clinical  observations.  Premature Infant recommended reference ranges:  Up to 24 hours.............<8.0 mg/dL  Up to 48 hours............<12.0 mg/dL  3-5 days..................<15.0 mg/dL  6-29 days.................<15.0 mg/dL       BUN, Bld     15     Calcium     9.5     Chloride     105     CO2     21(L)     Coronary Stenosis >= 50%(A)         Coronary Stent Yes(A)         Creatinine      0.8     D-Dimer     0.27  Comment:  The quantitative D-dimer assay should be used as an aid in   the diagnosis of deep vein thrombosis and pulmonary embolism  in patients with the appropriate presentation and clinical  history. The upper limit of the reference interval and the clinical   cut off   point are identical. Causes of a positive (>0.50 mg/L FEU) D-Dimer   test  include, but are not limited to: DVT, PE, DIC, thrombolytic   therapy, anticoagulant therapy, recent surgery, trauma, or   pregnancy, disseminated malignancy, aortic aneurysm, cirrhosis,  and severe infection. False negative results may occur in   patients with distal DVT.       Diastolic Dysfunction    No      Differential Method     Automated     EF    60      eGFR if      >60     eGFR if non      >60  Comment:  Calculation used to obtain the estimated glomerular filtration  rate (eGFR) is the CKD-EPI equation.        Eos #     0.1     Eosinophil%     0.8     Glucose     118(H)     Gran # (ANC)     12.6(H)     Gran%     79.7(H)     Hematocrit     42.4     Hemoglobin     14.9     Coumadin Monitoring INR     1.0  Comment:  Coumadin Therapy:  2.0 - 3.0 for INR for all indicators except mechanical heart valves  and antiphospholipid syndromes which should use 2.5 - 3.5.       Lymph #     1.9     Lymph%     12.2(L)     MCH     32.1(H)     MCHC     35.1     MCV     91     Mono #     1.2(H)     Mono%     7.2     MPV     10.4     Platelets     241     POC ACTIVATED CLOTTING TIME K  362(H) 125       Potassium     3.8     Total Protein     7.1     Protime     10.7     RBC     4.64     RDW     12.9     Sample  unknown unknown       Sodium     137     Troponin I     4.411  Comment:  The reference interval for Troponin I represents the 99th percentile   cutoff   for our facility and is consistent with 3rd generation assay   performance.  (H)     WBC     15.87(H)                     Significant Imaging: Echocardiogram:   2D echo  with color flow doppler:   Results for orders placed or performed during the hospital encounter of 04/13/18   2D echo with color flow doppler   Result Value Ref Range    EF 60 55 - 65    Diastolic Dysfunction No     and X-Ray: CXR: X-Ray Chest 1 View (CXR): No results found for this visit on 04/13/18. and X-Ray Chest PA and Lateral (CXR): No results found for this visit on 04/13/18.    Assessment and Plan:       * NSTEMI (non-ST elevated myocardial infarction)    Is post successful PCI of RCA on yesterday   -No BB due to bradycardia   -2D echo shows normal LVF   -Continue ASA, Effient, Statin and BB.   -Normal LVF, so no ACEI ordered   -He will follow up in clinic next week with midlevel or Dr. Choi         Tobacco abuse    Counseled on smoking cessation             VTE Risk Mitigation         Ordered     IP VTE HIGH RISK PATIENT  Once      04/13/18 1614     Reason for No Pharmacological VTE Prophylaxis  Once      04/13/18 1614        Chart reviewed. Patient examined by Dr. Choi and agrees with plan that has been outlined.     INDERJIT Auguste  Cardiology  [facility]

## 2018-04-14 NOTE — HOSPITAL COURSE
4/14/18- patient is post angiogram on yesterday for NSTEMI. Had successful PCI of occluded RCA. Tolerated procedure well. No chest pain or angina overnight. Labs and vitals stable today . EF 60% on echo

## 2018-04-14 NOTE — PLAN OF CARE
Problem: Patient Care Overview  Goal: Plan of Care Review  Outcome: Ongoing (interventions implemented as appropriate)  Pt resting in bed on room air. Sheath pulled at 2135 and pressure held for 15 minutes and pressure dressing applied. Pt had initial small area of bruising before sheath pulled; marked with marker. No sign of increased size in bruising or new hematoma. Pt vitally stable and afebrile throughout shift. No acute distress noted in pt at this time. Will continue to monitor pt closely and follow POC.

## 2018-04-14 NOTE — ASSESSMENT & PLAN NOTE
Is post successful PCI of RCA on yesterday   -No BB due to bradycardia   -2D echo shows normal LVF   -Continue ASA, Effient, Statin and BB.   -Normal LVF, so no ACEI ordered   -He will follow up in clinic next week with midlevel or Dr. Choi

## 2018-04-14 NOTE — DISCHARGE SUMMARY
[facility] Hospital Medicine  Discharge Summary      Patient Name: Anatoly Polanco  MRN: [id number]  Admission Date: 4/13/2018  Hospital Length of Stay: 1 days  Discharge Date and Time:  04/14/2018 10:56 AM  Attending Physician: Gem Bartholomew MD   Discharging Provider: Gem Bartholomew MD  Primary Care Provider: Pool Martinez MD      HPI:   Anatoly Polanco is a 52 year old male with hypertension and hyperlipidemia who presented to the ED with complaints of chest pain that began yesterday afternoon while he was working. The patient describes the pain as sharp, located over his entire chest. The pain radiated into his back, bilateral arms and bilateral jaw. There was associated diaphoresis and nausea. Symptoms were improved by a pain pill last night, but were present at their initial intensity this morning. The patient denies vomiting, SOB and fever. He is followed by Dr. Sharma and reports having a stress test within the last two years. He is compliant with his lisinopril and Lipitor. In the ED, the patient was found to have a troponin of 4.411 and bradycardia with nonspecific EKG changes.      Procedure(s) (LRB):  HEART CATH-LEFT (N/A)      Hospital Course:   PCI occluded distal RCA Stent Resolute FRANCI x one  Nonobstructive disease elsewhere  LVEF 70%  Effient and ASA added  Lipitor increased to 80 mg PO daily    Seen and examined , stable for discharge     Consults:   Consults         Status Ordering Provider     Inpatient consult to Cardiology  Once     Provider:  Je Choi MD    Completed MEG KEEN JR          No new Assessment & Plan notes have been filed under this hospital service since the last note was generated.  Service: Hospital Medicine    Final Active Diagnoses:    Diagnosis Date Noted POA    PRINCIPAL PROBLEM:  NSTEMI (non-ST elevated myocardial infarction) [I21.4] 04/13/2018 Yes    Hypertension [I10] 04/13/2018 Yes    Tobacco abuse [Z72.0] 04/13/2018 Yes       Problems Resolved During this Admission:    Diagnosis Date Noted Date Resolved POA    Leukocytosis [D72.829] 04/13/2018 04/14/2018 Unknown       Discharged Condition: good    Disposition: Home or Self Care    Follow Up:  Follow-up Information     Pool Martinez MD.    Specialty:  Pediatrics  Contact information:  [address]  [facility] [address]             Je Choi MD In 1 week.    Specialties:  Cardiology, INTERVENTIONAL CARDIOLOGY  Contact information:  [address]  [facility] [address] [phone]                 Patient Instructions:   No discharge procedures on file.    Significant Diagnostic Studies: Labs:   BMP:   Recent Labs  Lab 04/13/18  1234 04/14/18  0431   * 169*    136   K 3.8 3.7    104   CO2 21* 22*   BUN 15 9   CREATININE 0.8 0.8   CALCIUM 9.5 8.9   , CMP   Recent Labs  Lab 04/13/18  1234 04/14/18  0431    136   K 3.8 3.7    104   CO2 21* 22*   * 169*   BUN 15 9   CREATININE 0.8 0.8   CALCIUM 9.5 8.9   PROT 7.1 6.5   ALBUMIN 4.4 3.9   BILITOT 0.5 0.9   ALKPHOS 84 77   AST 79* 97*   ALT 32 35   ANIONGAP 11 10   ESTGFRAFRICA >60 >60   EGFRNONAA >60 >60    and CBC   Recent Labs  Lab 04/13/18  1234 04/14/18  0431   WBC 15.87* 11.54  11.54  11.54   HGB 14.9 13.9*  13.9*  13.9*   HCT 42.4 40.7  40.7  40.7    226  226  226  226       Pending Diagnostic Studies:     Procedure Component Value Units Date/Time    EKG 12-LEAD on arrival to floor [[id number]]     Order Status:  Sent Lab Status:  No result     IR Heart Cath Images [[id number]] Resulted:  04/13/18 1549    Order Status:  Sent Lab Status:  In process Updated:  04/13/18 1810         Medications:  Reconciled Home Medications:      Medication List      START taking these medications    aspirin 81 MG EC tablet  Commonly known as:  ECOTRIN  Take 1 tablet (81 mg total) by mouth once daily.  Start taking on:  4/15/2018     metoprolol tartrate 25 MG tablet  Commonly known as:   LOPRESSOR  Take 1 tablet (25 mg total) by mouth 2 (two) times daily.     prasugrel 10 mg Tab  Commonly known as:  EFFIENT  Take 1 tablet (10 mg total) by mouth once daily.  Start taking on:  4/15/2018        CHANGE how you take these medications    atorvastatin 80 MG tablet  Commonly known as:  LIPITOR  Take 1 tablet (80 mg total) by mouth every evening.  What changed:  · medication strength  · how much to take  · when to take this        STOP taking these medications    lisinopril 20 MG tablet  Commonly known as:  PRINIVIL,ZESTRIL     meloxicam 15 MG tablet  Commonly known as:  MOBIC            Indwelling Lines/Drains at time of discharge:   Lines/Drains/Airways          No matching active lines, drains, or airways          Time spent on the discharge of patient: 40 minutes  Patient was seen and examined on the date of discharge and determined to be suitable for discharge.        Gem Bartholomew MD  Department of Hospital Medicine  [facility]

## 2018-04-14 NOTE — HOSPITAL COURSE
PCI occluded distal RCA Stent Resolute FRANCI x one  Nonobstructive disease elsewhere  LVEF 70%  Effient and ASA added  Lipitor increased to 80 mg PO daily    Seen and examined , stable for discharge

## 2018-04-15 NOTE — PLAN OF CARE
04/14/18 1258   Final Note   Assessment Type Final Discharge Note   Discharge Disposition Home   Right [facility] Referral Info   Post Acute Recommendation No Care

## 2018-04-16 ENCOUNTER — TELEPHONE (OUTPATIENT)
Dept: CARDIOLOGY | Facility: CLINIC | Age: 53
End: 2018-04-16

## 2018-04-16 NOTE — TELEPHONE ENCOUNTER
----- Message from Isaura Oseguera sent at 4/16/2018 11:25 AM CDT -----  Contact: pt spouse-Abby Mora state the patient was recently discharged from the hospital and was advised by provider to follow up Thursday or Friday of this week. Abby was advised of availability and offered an appt on 4/24, but declined. Please adv/call [phone].[email or web address]

## 2018-04-18 PROBLEM — Z95.5 STENTED CORONARY ARTERY: Status: ACTIVE | Noted: 2018-04-18

## 2018-04-18 PROBLEM — I25.10 CORONARY ARTERY DISEASE INVOLVING NATIVE CORONARY ARTERY OF NATIVE HEART WITHOUT ANGINA PECTORIS: Status: ACTIVE | Noted: 2018-04-18

## 2021-09-09 ENCOUNTER — TELEPHONE (OUTPATIENT)
Dept: GASTROENTEROLOGY | Facility: CLINIC | Age: 56
End: 2021-09-09

## 2021-09-09 DIAGNOSIS — Z12.11 COLON CANCER SCREENING: Primary | ICD-10-CM

## 2021-09-15 ENCOUNTER — TELEPHONE (OUTPATIENT)
Dept: GASTROENTEROLOGY | Facility: CLINIC | Age: 56
End: 2021-09-15

## 2021-09-17 ENCOUNTER — LAB VISIT (OUTPATIENT)
Dept: LAB | Facility: HOSPITAL | Age: 56
End: 2021-09-17
Attending: PEDIATRICS
Payer: COMMERCIAL

## 2021-09-17 ENCOUNTER — HOSPITAL ENCOUNTER (OUTPATIENT)
Dept: RADIOLOGY | Facility: HOSPITAL | Age: 56
Discharge: HOME | End: 2021-09-17
Attending: PEDIATRICS
Payer: COMMERCIAL

## 2021-09-17 DIAGNOSIS — R31.9 HEMATURIA SYNDROME: ICD-10-CM

## 2021-09-17 DIAGNOSIS — R73.03 DIABETES MELLITUS, LATENT: ICD-10-CM

## 2021-09-17 DIAGNOSIS — Z11.59 SCREENING EXAMINATION FOR POLIOMYELITIS: ICD-10-CM

## 2021-09-17 DIAGNOSIS — I10 ESSENTIAL HYPERTENSION, MALIGNANT: ICD-10-CM

## 2021-09-17 DIAGNOSIS — Z12.5 SPECIAL SCREENING FOR MALIGNANT NEOPLASM OF PROSTATE: ICD-10-CM

## 2021-09-17 DIAGNOSIS — R74.8 ACID PHOSPHATASE ELEVATED: ICD-10-CM

## 2021-09-17 DIAGNOSIS — E78.5 HYPERLIPEMIA: ICD-10-CM

## 2021-09-17 DIAGNOSIS — Z87.891 PERSONAL HISTORY OF TOBACCO USE, PRESENTING HAZARDS TO HEALTH: ICD-10-CM

## 2021-09-17 LAB
ALBUMIN SERPL BCP-MCNC: 4.6 G/DL (ref 3.5–5.2)
ALP SERPL-CCNC: 96 U/L (ref 55–135)
ALT SERPL W/O P-5'-P-CCNC: 36 U/L (ref 10–44)
ANION GAP SERPL CALC-SCNC: 13 MMOL/L (ref 8–16)
AST SERPL-CCNC: 31 U/L (ref 10–40)
BACTERIA #/AREA URNS HPF: ABNORMAL /HPF
BASOPHILS # BLD AUTO: 0.04 K/UL (ref 0–0.2)
BASOPHILS NFR BLD: 0.4 % (ref 0–1.9)
BILIRUB SERPL-MCNC: 0.6 MG/DL (ref 0.1–1)
BILIRUB UR QL STRIP: NEGATIVE
BUN SERPL-MCNC: 11 MG/DL (ref 6–20)
CALCIUM SERPL-MCNC: 10.1 MG/DL (ref 8.7–10.5)
CHLORIDE SERPL-SCNC: 103 MMOL/L (ref 95–110)
CHOLEST SERPL-MCNC: 110 MG/DL (ref 120–199)
CHOLEST/HDLC SERPL: 3.4 {RATIO} (ref 2–5)
CK SERPL-CCNC: 339 U/L (ref 20–200)
CLARITY UR: CLEAR
CO2 SERPL-SCNC: 23 MMOL/L (ref 23–29)
COLOR UR: YELLOW
COMPLEXED PSA SERPL-MCNC: 0.12 NG/ML (ref 0–4)
CREAT SERPL-MCNC: 0.9 MG/DL (ref 0.5–1.4)
DIFFERENTIAL METHOD: ABNORMAL
EOSINOPHIL # BLD AUTO: 0.1 K/UL (ref 0–0.5)
EOSINOPHIL NFR BLD: 1.2 % (ref 0–8)
ERYTHROCYTE [DISTWIDTH] IN BLOOD BY AUTOMATED COUNT: 12.7 % (ref 11.5–14.5)
EST. GFR  (AFRICAN AMERICAN): >60 ML/MIN/1.73 M^2
EST. GFR  (NON AFRICAN AMERICAN): >60 ML/MIN/1.73 M^2
ESTIMATED AVG GLUCOSE: 120 MG/DL (ref 68–131)
GLUCOSE SERPL-MCNC: 116 MG/DL (ref 70–110)
GLUCOSE UR QL STRIP: NEGATIVE
HBA1C MFR BLD: 5.8 % (ref 4–5.6)
HCT VFR BLD AUTO: 52.6 % (ref 40–54)
HDLC SERPL-MCNC: 32 MG/DL (ref 40–75)
HDLC SERPL: 29.1 % (ref 20–50)
HGB BLD-MCNC: 17.4 G/DL (ref 14–18)
HGB UR QL STRIP: ABNORMAL
IMM GRANULOCYTES # BLD AUTO: 0.03 K/UL (ref 0–0.04)
IMM GRANULOCYTES NFR BLD AUTO: 0.3 % (ref 0–0.5)
KETONES UR QL STRIP: NEGATIVE
LDLC SERPL CALC-MCNC: 65.8 MG/DL (ref 63–159)
LEUKOCYTE ESTERASE UR QL STRIP: NEGATIVE
LYMPHOCYTES # BLD AUTO: 2.9 K/UL (ref 1–4.8)
LYMPHOCYTES NFR BLD: 26.8 % (ref 18–48)
MCH RBC QN AUTO: 31.6 PG (ref 27–31)
MCHC RBC AUTO-ENTMCNC: 33.1 G/DL (ref 32–36)
MCV RBC AUTO: 96 FL (ref 82–98)
MICROSCOPIC COMMENT: ABNORMAL
MONOCYTES # BLD AUTO: 0.7 K/UL (ref 0.3–1)
MONOCYTES NFR BLD: 6.9 % (ref 4–15)
NEUTROPHILS # BLD AUTO: 6.9 K/UL (ref 1.8–7.7)
NEUTROPHILS NFR BLD: 64.4 % (ref 38–73)
NITRITE UR QL STRIP: NEGATIVE
NONHDLC SERPL-MCNC: 78 MG/DL
NRBC BLD-RTO: 0 /100 WBC
PH UR STRIP: 6 [PH] (ref 5–8)
PLATELET # BLD AUTO: 253 K/UL (ref 150–450)
PMV BLD AUTO: 11.8 FL (ref 9.2–12.9)
POTASSIUM SERPL-SCNC: 4.3 MMOL/L (ref 3.5–5.1)
PROT SERPL-MCNC: 7.9 G/DL (ref 6–8.4)
PROT UR QL STRIP: NEGATIVE
RBC # BLD AUTO: 5.5 M/UL (ref 4.6–6.2)
RBC #/AREA URNS HPF: 8 /HPF (ref 0–4)
SODIUM SERPL-SCNC: 139 MMOL/L (ref 136–145)
SP GR UR STRIP: 1.01 (ref 1–1.03)
TRIGL SERPL-MCNC: 61 MG/DL (ref 30–150)
URN SPEC COLLECT METH UR: ABNORMAL
WBC # BLD AUTO: 10.63 K/UL (ref 3.9–12.7)
WBC #/AREA URNS HPF: 1 /HPF (ref 0–5)

## 2021-09-17 PROCEDURE — 81000 URINALYSIS NONAUTO W/SCOPE: CPT | Performed by: PEDIATRICS

## 2021-09-17 PROCEDURE — 84153 ASSAY OF PSA TOTAL: CPT | Performed by: PEDIATRICS

## 2021-09-17 PROCEDURE — 71271 CT THORAX LUNG CANCER SCR C-: CPT | Mod: 26,,, | Performed by: RADIOLOGY

## 2021-09-17 PROCEDURE — 71271 CT THORAX LUNG CANCER SCR C-: CPT | Mod: TC

## 2021-09-17 PROCEDURE — 86803 HEPATITIS C AB TEST: CPT | Performed by: PEDIATRICS

## 2021-09-17 PROCEDURE — 82550 ASSAY OF CK (CPK): CPT | Performed by: PEDIATRICS

## 2021-09-17 PROCEDURE — 36415 COLL VENOUS BLD VENIPUNCTURE: CPT | Performed by: PEDIATRICS

## 2021-09-17 PROCEDURE — 80061 LIPID PANEL: CPT | Performed by: PEDIATRICS

## 2021-09-17 PROCEDURE — 80053 COMPREHEN METABOLIC PANEL: CPT | Performed by: PEDIATRICS

## 2021-09-17 PROCEDURE — 71271 CT CHEST LUNG SCREENING LOW DOSE: ICD-10-PCS | Mod: 26,,, | Performed by: RADIOLOGY

## 2021-09-17 PROCEDURE — 85025 COMPLETE CBC W/AUTO DIFF WBC: CPT | Performed by: PEDIATRICS

## 2021-09-17 PROCEDURE — 83036 HEMOGLOBIN GLYCOSYLATED A1C: CPT | Performed by: PEDIATRICS

## 2021-09-20 LAB — HCV AB SERPL QL IA: NEGATIVE

## 2021-10-19 ENCOUNTER — TELEPHONE (OUTPATIENT)
Dept: ENDOSCOPY | Facility: HOSPITAL | Age: 56
End: 2021-10-19

## 2021-10-19 DIAGNOSIS — Z12.11 SCREENING FOR COLON CANCER: Primary | ICD-10-CM

## 2021-10-19 RX ORDER — SODIUM, POTASSIUM,MAG SULFATES 17.5-3.13G
1 SOLUTION, RECONSTITUTED, ORAL ORAL DAILY
Qty: 1 KIT | Refills: 0 | Status: SHIPPED | OUTPATIENT
Start: 2021-10-19 | End: 2021-10-21

## 2021-11-11 ENCOUNTER — TELEPHONE (OUTPATIENT)
Dept: ADMINISTRATIVE | Facility: OTHER | Age: 56
End: 2021-11-11
Payer: COMMERCIAL

## 2021-11-18 ENCOUNTER — ANESTHESIA EVENT (OUTPATIENT)
Dept: ENDOSCOPY | Facility: HOSPITAL | Age: 56
End: 2021-11-18
Payer: COMMERCIAL

## 2021-11-18 ENCOUNTER — ANESTHESIA (OUTPATIENT)
Dept: ENDOSCOPY | Facility: HOSPITAL | Age: 56
End: 2021-11-18
Payer: COMMERCIAL

## 2021-11-18 ENCOUNTER — HOSPITAL ENCOUNTER (OUTPATIENT)
Facility: HOSPITAL | Age: 56
Discharge: HOME | End: 2021-11-18
Attending: INTERNAL MEDICINE | Admitting: INTERNAL MEDICINE
Payer: COMMERCIAL

## 2021-11-18 VITALS
TEMPERATURE: 97 F | HEART RATE: 66 BPM | WEIGHT: 217 LBS | BODY MASS INDEX: 29.43 KG/M2 | OXYGEN SATURATION: 94 % | SYSTOLIC BLOOD PRESSURE: 128 MMHG | DIASTOLIC BLOOD PRESSURE: 59 MMHG | RESPIRATION RATE: 18 BRPM

## 2021-11-18 DIAGNOSIS — Z12.11 COLON CANCER SCREENING: Primary | ICD-10-CM

## 2021-11-18 PROCEDURE — 27201089 HC SNARE, DISP (ANY): Performed by: INTERNAL MEDICINE

## 2021-11-18 PROCEDURE — 45385 COLONOSCOPY W/LESION REMOVAL: CPT | Performed by: INTERNAL MEDICINE

## 2021-11-18 PROCEDURE — 45385 PR COLONOSCOPY,REMV LESN,SNARE: ICD-10-PCS | Mod: 33,,, | Performed by: INTERNAL MEDICINE

## 2021-11-18 PROCEDURE — 25000003 PHARM REV CODE 250: Performed by: ANESTHESIOLOGY

## 2021-11-18 PROCEDURE — 63600175 PHARM REV CODE 636 W HCPCS: Performed by: ANESTHESIOLOGY

## 2021-11-18 PROCEDURE — 88305 TISSUE EXAM BY PATHOLOGIST: CPT | Mod: 26,,, | Performed by: PATHOLOGY

## 2021-11-18 PROCEDURE — 37000009 HC ANESTHESIA EA ADD 15 MINS: Performed by: INTERNAL MEDICINE

## 2021-11-18 PROCEDURE — 88305 TISSUE EXAM BY PATHOLOGIST: CPT | Performed by: PATHOLOGY

## 2021-11-18 PROCEDURE — 45385 COLONOSCOPY W/LESION REMOVAL: CPT | Mod: 33,,, | Performed by: INTERNAL MEDICINE

## 2021-11-18 PROCEDURE — 37000008 HC ANESTHESIA 1ST 15 MINUTES: Performed by: INTERNAL MEDICINE

## 2021-11-18 PROCEDURE — 27201012 HC FORCEPS, HOT/COLD, DISP: Performed by: INTERNAL MEDICINE

## 2021-11-18 PROCEDURE — 88305 TISSUE EXAM BY PATHOLOGIST: ICD-10-PCS | Mod: 26,,, | Performed by: PATHOLOGY

## 2021-11-18 RX ORDER — LIDOCAINE HCL/PF 100 MG/5ML
SYRINGE (ML) INTRAVENOUS
Status: DISCONTINUED | OUTPATIENT
Start: 2021-11-18 | End: 2021-11-18

## 2021-11-18 RX ORDER — SODIUM CHLORIDE 9 MG/ML
INJECTION, SOLUTION INTRAVENOUS CONTINUOUS
Status: DISCONTINUED | OUTPATIENT
Start: 2021-11-18 | End: 2021-11-18 | Stop reason: HOSPADM

## 2021-11-18 RX ORDER — PROPOFOL 10 MG/ML
VIAL (ML) INTRAVENOUS
Status: DISCONTINUED | OUTPATIENT
Start: 2021-11-18 | End: 2021-11-18

## 2021-11-18 RX ORDER — SODIUM CHLORIDE, SODIUM LACTATE, POTASSIUM CHLORIDE, CALCIUM CHLORIDE 600; 310; 30; 20 MG/100ML; MG/100ML; MG/100ML; MG/100ML
INJECTION, SOLUTION INTRAVENOUS CONTINUOUS PRN
Status: DISCONTINUED | OUTPATIENT
Start: 2021-11-18 | End: 2021-11-18

## 2021-11-18 RX ADMIN — PROPOFOL 40 MG: 10 INJECTION, EMULSION INTRAVENOUS at 07:11

## 2021-11-18 RX ADMIN — PROPOFOL 30 MG: 10 INJECTION, EMULSION INTRAVENOUS at 07:11

## 2021-11-18 RX ADMIN — PROPOFOL 40 MG: 10 INJECTION, EMULSION INTRAVENOUS at 08:11

## 2021-11-18 RX ADMIN — PROPOFOL 100 MG: 10 INJECTION, EMULSION INTRAVENOUS at 07:11

## 2021-11-18 RX ADMIN — LIDOCAINE HYDROCHLORIDE 50 MG: 20 INJECTION, SOLUTION INTRAVENOUS at 07:11

## 2021-11-18 RX ADMIN — SODIUM CHLORIDE, SODIUM LACTATE, POTASSIUM CHLORIDE, AND CALCIUM CHLORIDE: .6; .31; .03; .02 INJECTION, SOLUTION INTRAVENOUS at 07:11

## 2021-11-22 LAB
FINAL PATHOLOGIC DIAGNOSIS: NORMAL
GROSS: NORMAL
Lab: NORMAL